# Patient Record
Sex: MALE | Race: WHITE | NOT HISPANIC OR LATINO | Employment: FULL TIME | URBAN - METROPOLITAN AREA
[De-identification: names, ages, dates, MRNs, and addresses within clinical notes are randomized per-mention and may not be internally consistent; named-entity substitution may affect disease eponyms.]

---

## 2023-08-11 LAB — URINE CULTURE: NORMAL

## 2023-11-17 DIAGNOSIS — Z00.00 ANNUAL PHYSICAL EXAM: Primary | ICD-10-CM

## 2023-11-21 ENCOUNTER — PREP FOR PROCEDURE (OUTPATIENT)
Dept: ORTHOPEDIC SURGERY | Facility: CLINIC | Age: 58
End: 2023-11-21
Payer: COMMERCIAL

## 2023-11-21 DIAGNOSIS — M16.11 ARTHRITIS OF RIGHT HIP: Primary | ICD-10-CM

## 2023-11-21 RX ORDER — SODIUM CHLORIDE, SODIUM LACTATE, POTASSIUM CHLORIDE, CALCIUM CHLORIDE 600; 310; 30; 20 MG/100ML; MG/100ML; MG/100ML; MG/100ML
100 INJECTION, SOLUTION INTRAVENOUS CONTINUOUS
Status: CANCELLED | OUTPATIENT
Start: 2023-11-21

## 2023-11-21 RX ORDER — TRANEXAMIC ACID 100 MG/ML
1000 INJECTION, SOLUTION INTRAVENOUS
Status: CANCELLED | OUTPATIENT
Start: 2023-11-21 | End: 2023-11-21

## 2023-11-28 PROBLEM — M16.11 ARTHRITIS OF RIGHT HIP: Status: ACTIVE | Noted: 2023-11-21

## 2024-01-04 ENCOUNTER — OFFICE VISIT (OUTPATIENT)
Dept: ORTHOPEDIC SURGERY | Facility: HOSPITAL | Age: 59
End: 2024-01-04
Payer: COMMERCIAL

## 2024-01-04 VITALS — HEIGHT: 73 IN | BODY MASS INDEX: 27.83 KG/M2 | WEIGHT: 210 LBS

## 2024-01-04 DIAGNOSIS — M16.11 ARTHRITIS OF RIGHT HIP: Primary | ICD-10-CM

## 2024-01-04 PROCEDURE — 1036F TOBACCO NON-USER: CPT | Performed by: ORTHOPAEDIC SURGERY

## 2024-01-04 PROCEDURE — 99204 OFFICE O/P NEW MOD 45 MIN: CPT | Performed by: ORTHOPAEDIC SURGERY

## 2024-01-04 PROCEDURE — 99214 OFFICE O/P EST MOD 30 MIN: CPT | Performed by: ORTHOPAEDIC SURGERY

## 2024-01-04 RX ORDER — ROSUVASTATIN CALCIUM 20 MG/1
20 TABLET, COATED ORAL
COMMUNITY
Start: 2022-08-20 | End: 2024-01-21 | Stop reason: SDUPTHER

## 2024-01-04 RX ORDER — ALLOPURINOL 100 MG/1
1 TABLET ORAL DAILY
COMMUNITY
Start: 2018-10-12 | End: 2024-02-28

## 2024-01-04 ASSESSMENT — PAIN SCALES - GENERAL: PAINLEVEL_OUTOF10: 8

## 2024-01-04 ASSESSMENT — PAIN - FUNCTIONAL ASSESSMENT: PAIN_FUNCTIONAL_ASSESSMENT: 0-10

## 2024-01-04 ASSESSMENT — PAIN DESCRIPTION - DESCRIPTORS: DESCRIPTORS: SHARP;THROBBING

## 2024-01-04 NOTE — PROGRESS NOTES
58-year-old is seen with right hip pain.  He has been having persistent severe sharp shooting pain in the right hip.  He has difficulty with standing and walking and going up and down stairs and getting up and down from a chair in and out of a car.  Has difficulty putting on his shoes and socks.  He does a regular physical therapy type exercise program without relief.  He said pain for many years.  No significant relief with Tylenol or NSAIDs.    Pleasant in no acute distress.  Walks an antalgic gait.  Right hip range of motion is 0 to 85 degrees with limited internal/external rotation.  There is pain and crepitus with range of motion.  Left hip flexes 95 degrees good internal/external rotation without pain.  Both lower extremities are well-perfused the skin is intact and muscle tone is adequate.    Multiple x-ray views of the right hip are personally reviewed and there is advanced degenerative disease complete loss of joint space and osteophyte formation subchondral sclerosis and cystic change.    A detailed discussion about hip arthritis was done.  The patient has been through extensive conservative treatment but there is persistent severe debilitating pain.  Treatment options including no treatment were reviewed and decision was made to proceed with right total hip arthroplasty.  The surgery and postoperative course were reviewed in detail.  Risks including but not limited to infection thromboembolus neurovascular injury fracture bleeding medical problems stiffness implant failure loosening dislocation and leg length discrepancy were discussed and they understands this and has elected to proceed.  They will avoid aggravating activities in the meantime.  He will have medical clearance.  He can have TXA intravenously at the time of the procedure.

## 2024-01-05 ENCOUNTER — ANCILLARY PROCEDURE (OUTPATIENT)
Dept: RADIOLOGY | Facility: CLINIC | Age: 59
End: 2024-01-05
Payer: COMMERCIAL

## 2024-01-05 ENCOUNTER — LAB (OUTPATIENT)
Dept: LAB | Facility: LAB | Age: 59
End: 2024-01-05
Payer: COMMERCIAL

## 2024-01-05 DIAGNOSIS — M16.11 ARTHRITIS OF RIGHT HIP: ICD-10-CM

## 2024-01-05 DIAGNOSIS — Z00.00 ANNUAL PHYSICAL EXAM: ICD-10-CM

## 2024-01-05 LAB
25(OH)D3 SERPL-MCNC: 39 NG/ML (ref 30–100)
ABO GROUP (TYPE) IN BLOOD: NORMAL
ANION GAP SERPL CALC-SCNC: 16 MMOL/L (ref 10–20)
ANTIBODY SCREEN: NORMAL
APPEARANCE UR: CLEAR
APTT PPP: 35 SECONDS (ref 27–38)
BILIRUB UR STRIP.AUTO-MCNC: NEGATIVE MG/DL
BUN SERPL-MCNC: 11 MG/DL (ref 6–23)
CALCIUM SERPL-MCNC: 9.9 MG/DL (ref 8.6–10.6)
CHLORIDE SERPL-SCNC: 101 MMOL/L (ref 98–107)
CO2 SERPL-SCNC: 28 MMOL/L (ref 21–32)
COLOR UR: ABNORMAL
CREAT SERPL-MCNC: 1.14 MG/DL (ref 0.5–1.3)
ERYTHROCYTE [DISTWIDTH] IN BLOOD BY AUTOMATED COUNT: 12.8 % (ref 11.5–14.5)
EST. AVERAGE GLUCOSE BLD GHB EST-MCNC: 108 MG/DL
GFR SERPL CREATININE-BSD FRML MDRD: 74 ML/MIN/1.73M*2
GLUCOSE SERPL-MCNC: 93 MG/DL (ref 74–99)
GLUCOSE UR STRIP.AUTO-MCNC: NEGATIVE MG/DL
HBA1C MFR BLD: 5.4 %
HCT VFR BLD AUTO: 44.2 % (ref 41–52)
HGB BLD-MCNC: 15.5 G/DL (ref 13.5–17.5)
INR PPP: 1.2 (ref 0.9–1.1)
KETONES UR STRIP.AUTO-MCNC: NEGATIVE MG/DL
LEUKOCYTE ESTERASE UR QL STRIP.AUTO: NEGATIVE
MCH RBC QN AUTO: 30.9 PG (ref 26–34)
MCHC RBC AUTO-ENTMCNC: 35.1 G/DL (ref 32–36)
MCV RBC AUTO: 88 FL (ref 80–100)
NITRITE UR QL STRIP.AUTO: NEGATIVE
NRBC BLD-RTO: 0 /100 WBCS (ref 0–0)
PH UR STRIP.AUTO: 6 [PH]
PLATELET # BLD AUTO: 239 X10*3/UL (ref 150–450)
POTASSIUM SERPL-SCNC: 4 MMOL/L (ref 3.5–5.3)
PROT UR STRIP.AUTO-MCNC: NEGATIVE MG/DL
PROTHROMBIN TIME: 13.1 SECONDS (ref 9.8–12.8)
PSA SERPL-MCNC: 0.55 NG/ML
RBC # BLD AUTO: 5.02 X10*6/UL (ref 4.5–5.9)
RBC # UR STRIP.AUTO: NEGATIVE /UL
RH FACTOR (ANTIGEN D): NORMAL
SODIUM SERPL-SCNC: 141 MMOL/L (ref 136–145)
SP GR UR STRIP.AUTO: 1
TSH SERPL-ACNC: 5.02 MIU/L (ref 0.44–3.98)
UROBILINOGEN UR STRIP.AUTO-MCNC: <2 MG/DL
VIT B12 SERPL-MCNC: 404 PG/ML (ref 211–911)
WBC # BLD AUTO: 6.2 X10*3/UL (ref 4.4–11.3)

## 2024-01-05 PROCEDURE — 86901 BLOOD TYPING SEROLOGIC RH(D): CPT

## 2024-01-05 PROCEDURE — 86850 RBC ANTIBODY SCREEN: CPT

## 2024-01-05 PROCEDURE — 85027 COMPLETE CBC AUTOMATED: CPT

## 2024-01-05 PROCEDURE — 81003 URINALYSIS AUTO W/O SCOPE: CPT

## 2024-01-05 PROCEDURE — 85610 PROTHROMBIN TIME: CPT

## 2024-01-05 PROCEDURE — 82306 VITAMIN D 25 HYDROXY: CPT

## 2024-01-05 PROCEDURE — 36415 COLL VENOUS BLD VENIPUNCTURE: CPT

## 2024-01-05 PROCEDURE — 86900 BLOOD TYPING SEROLOGIC ABO: CPT

## 2024-01-05 PROCEDURE — 85730 THROMBOPLASTIN TIME PARTIAL: CPT

## 2024-01-05 PROCEDURE — 73502 X-RAY EXAM HIP UNI 2-3 VIEWS: CPT | Mod: RIGHT SIDE | Performed by: RADIOLOGY

## 2024-01-05 PROCEDURE — 84443 ASSAY THYROID STIM HORMONE: CPT

## 2024-01-05 PROCEDURE — 80048 BASIC METABOLIC PNL TOTAL CA: CPT

## 2024-01-05 PROCEDURE — 73502 X-RAY EXAM HIP UNI 2-3 VIEWS: CPT | Mod: RT

## 2024-01-05 PROCEDURE — 84153 ASSAY OF PSA TOTAL: CPT

## 2024-01-05 PROCEDURE — 82607 VITAMIN B-12: CPT

## 2024-01-05 PROCEDURE — 83036 HEMOGLOBIN GLYCOSYLATED A1C: CPT

## 2024-01-06 LAB — HOLD SPECIMEN: NORMAL

## 2024-01-12 ENCOUNTER — APPOINTMENT (OUTPATIENT)
Dept: PRIMARY CARE | Facility: CLINIC | Age: 59
End: 2024-01-12
Payer: COMMERCIAL

## 2024-01-12 ENCOUNTER — APPOINTMENT (OUTPATIENT)
Dept: CARDIOLOGY | Facility: CLINIC | Age: 59
End: 2024-01-12
Payer: COMMERCIAL

## 2024-01-15 ENCOUNTER — PRE-ADMISSION TESTING (OUTPATIENT)
Dept: PREADMISSION TESTING | Facility: HOSPITAL | Age: 59
End: 2024-01-15
Payer: COMMERCIAL

## 2024-01-15 ENCOUNTER — TELEPHONE (OUTPATIENT)
Dept: PRIMARY CARE | Facility: CLINIC | Age: 59
End: 2024-01-15

## 2024-01-15 VITALS
HEART RATE: 74 BPM | WEIGHT: 212.74 LBS | SYSTOLIC BLOOD PRESSURE: 159 MMHG | TEMPERATURE: 97.2 F | DIASTOLIC BLOOD PRESSURE: 79 MMHG | OXYGEN SATURATION: 99 % | HEIGHT: 73 IN | RESPIRATION RATE: 18 BRPM | BODY MASS INDEX: 28.2 KG/M2

## 2024-01-15 DIAGNOSIS — M16.11 ARTHRITIS OF RIGHT HIP: ICD-10-CM

## 2024-01-15 DIAGNOSIS — Z01.818 PRE-OP TESTING: Primary | ICD-10-CM

## 2024-01-15 LAB
ABO GROUP (TYPE) IN BLOOD: NORMAL
ANTIBODY SCREEN: NORMAL
RH FACTOR (ANTIGEN D): NORMAL

## 2024-01-15 PROCEDURE — 93010 ELECTROCARDIOGRAM REPORT: CPT | Performed by: INTERNAL MEDICINE

## 2024-01-15 PROCEDURE — 93005 ELECTROCARDIOGRAM TRACING: CPT

## 2024-01-15 PROCEDURE — 86900 BLOOD TYPING SEROLOGIC ABO: CPT

## 2024-01-15 PROCEDURE — 94760 N-INVAS EAR/PLS OXIMETRY 1: CPT

## 2024-01-15 PROCEDURE — 36415 COLL VENOUS BLD VENIPUNCTURE: CPT

## 2024-01-15 PROCEDURE — 99204 OFFICE O/P NEW MOD 45 MIN: CPT | Performed by: NURSE PRACTITIONER

## 2024-01-15 PROCEDURE — 87081 CULTURE SCREEN ONLY: CPT | Mod: PARLAB | Performed by: NURSE PRACTITIONER

## 2024-01-15 RX ORDER — BISMUTH SUBSALICYLATE 262 MG
TABLET,CHEWABLE ORAL DAILY
COMMUNITY

## 2024-01-15 ASSESSMENT — DUKE ACTIVITY SCORE INDEX (DASI)
CAN YOU DO MODERATE WORK AROUND THE HOUSE LIKE VACUUMING, SWEEPING FLOORS OR CARRYING GROCERIES: YES
CAN YOU DO HEAVY WORK AROUND THE HOUSE LIKE SCRUBBING FLOORS OR LIFTING AND MOVING HEAVY FURNITURE: YES
CAN YOU WALK INDOORS, SUCH AS AROUND YOUR HOUSE: YES
CAN YOU DO YARD WORK LIKE RAKING LEAVES, WEEDING OR PUSHING A MOWER: YES
CAN YOU DO LIGHT WORK AROUND THE HOUSE LIKE DUSTING OR WASHING DISHES: YES
CAN YOU PARTICIPATE IN MODERATE RECREATIONAL ACTIVITIES LIKE GOLF, BOWLING, DANCING, DOUBLES TENNIS OR THROWING A BASEBALL OR FOOTBALL: NO
CAN YOU PARTICIPATE IN STRENOUS SPORTS LIKE SWIMMING, SINGLES TENNIS, FOOTBALL, BASKETBALL, OR SKIING: NO
CAN YOU HAVE SEXUAL RELATIONS: YES
DASI METS SCORE: 7.3
CAN YOU RUN A SHORT DISTANCE: NO
CAN YOU WALK A BLOCK OR TWO ON LEVEL GROUND: YES
CAN YOU TAKE CARE OF YOURSELF (EAT, DRESS, BATHE, OR USE TOILET): YES
CAN YOU CLIMB A FLIGHT OF STAIRS OR WALK UP A HILL: YES
TOTAL_SCORE: 36.7

## 2024-01-15 ASSESSMENT — PAIN - FUNCTIONAL ASSESSMENT: PAIN_FUNCTIONAL_ASSESSMENT: 0-10

## 2024-01-15 NOTE — PREPROCEDURE INSTRUCTIONS
Medication List            Accurate as of January 15, 2024  7:48 AM. Always use your most recent med list.                allopurinol 100 mg tablet  Commonly known as: Zyloprim  Medication Adjustments for Surgery: Continue until night before surgery     multivitamin tablet  Medication Adjustments for Surgery: Stop 7 days before surgery     rosuvastatin 20 mg tablet  Commonly known as: Crestor  Medication Adjustments for Surgery: Continue until night before surgery              PRE-OPERATIVE INSTRUCTIONS FOR SURGERY    *Do not eat anything after midnight the night of surgery.  This includes food of any kind (including hard candy, cough drops, mints and gum) and beverages (including coffee and soda).      NO MEDICATIONS AM OF SURGERY      *One of our staff members will call you ONE business day before your surgery, between 11a-2p  (EXPECT CALL FRIDATY)  To let you know the time to arrive.  If you have no received a call by 2 pm, call [phone]    *When you arrive at the hospital-->GO TO Registration on the ground floor    *Stop smoking 24 hours prior to surgery.  No Marijuana, CBD Oil or Vaping for 48 hours  *No alcohol 24 hours prior to surgery    *You will need a responsible adult to drive you home    You may be asked to remove your dentures, partial plate, eyeglasses or contact lenses before going to surgery.  Please bring a case for these items.    -Body piercings need to be removed.  Jewelry and valuables should be left at home.    -Put on loose,  comfortable, clean clothing, that will accommodate bandages    HOME PREOPERATIVE ANTIBACTERIAL SHOWER:  -This shower is a way of cleaning the skin with germ killing solution before surgery.  The solution contains Chorhexidine (CHG).   Let your DrMora Know if you are allergic to Chlorhexidine.    NIGHT BEFORE SURGERY:  IF you are having a TOTAL joint replacement- YOU WILL SHOWER 2 NIGHTS PRIOR to surgery    -Take a normal shower and wash your hair  -Turn OFF water to  avoid rinsing the antibacterial skin cleanser off (CHG).  -Apply the CHG cleanser to a clean and wet washcloth.  Lather your entire body from the neck down.    -DO NOT USE ON THE HEAD, FACE, or GENITALS.  -RINSE immediately if CHG is in contact with your eyes, ears or mouth  -Gently wash your body.  Have the CHG cleanser stay on your skin for 3 MINUTES.  -After 3 minutes, turn on water and rinse the CHG cleanser off your body completely  -DO NOT WASH with regular soap after using CHG.  -PAT DRY with a clean fresh towel  -DO NOT apply any milner, deodorants or lotions  -Dress in clean night cloths  **CHG cleanser will cause stains to fabrics if you wash them with bleach after use.     DAY OF SURGERY:  -Take shower-->JUST GET WET.  Turn OFF water.  -REPEAT the CHG cleanse as you did the night before.  -PAT DRY-->    What you may be asked to bring to surgery:  ___Crutches, walker  ______ PHOTO ID AND INSURANCE              NPO Instructions:    Do not eat any food after midnight the night before your surgery/procedure.    Additional Instructions:     Review your medication instructions, take indicated medications  Wear  comfortable loose fitting clothing  Do not use moisturizers, creams, lotions or perfume

## 2024-01-15 NOTE — CPM/PAT H&P
CPM/PAT Evaluation       Name: Tye Hendrickson (Tye Hendrickson)  /Age: 1965/58 y.o.     In-Person       Chief Complaint:     58 yr old male with c/o hip pain.    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   In October, he had trouble walking, trouble putting his foot on the ground--> followed up with Ortho.    He c/o pain to the right groin and lateral aspect of his hip.   He states he had radiation of pain to the calf and right buttock,  in the past.    Patient reports ongoing progressive pain worsened by activity including walking, standing, going up/down steps, and getting up/down from chair.      He has tried various treatments including ice/ heat, voltaren ointment and physical therapy but with no lasting relief.         Pain today is a 6-7 with activity, 0 with sitting.   He managed pain with Alleve in the past.                                                        Denies any cardiac or respiratory symptoms. Denies any past issues with anesthesia  Right total hip replacement is Scheduled on 2024  with Dr. Brasher    Past Medical History:   Diagnosis Date    Arthritis     Encounter for general adult medical examination without abnormal findings 2021    Encounter for wellness examination    Encounter for immunization 2021    Need for shingles vaccine    Hyperlipidemia     Joint pain     Nephrolithiasis     Vision loss        Past Surgical History:   Procedure Laterality Date    COLONOSCOPY      HERNIA REPAIR      OTHER SURGICAL HISTORY  2023    Laparoscopy Repair Of Initial Inguinal Hernia       Patient Sexual activity questions deferred to the physician.    Family History   Problem Relation Name Age of Onset    Hypertension Mother      Colon cancer Father         No Known Allergies    Prior to Admission medications     Medication Sig Start Date End Date Taking? Authorizing Provider   allopurinol (Zyloprim) 100 mg tablet Take 1 tablet (100 mg) by mouth once daily. 10/12/18   Historical Provider, MD   rosuvastatin (Crestor) 20 mg tablet Take 1 tablet (20 mg) by mouth once daily. 8/20/22   Historical Provider, MD        Review of Systems  Constitutional: NO F, chills, or sweats  Eyes: no blurred vision or visual disturbance  ENT: denies congestion, sore throat, difficulty hearing  Cardiovascular: no chest pain, no edema, no palps and no syncope.   Respiratory: no cough,no s.o.b. and no wheezing  Gastrointestinal: no abdominal pain, no N/V, no blood in stools  Genitourinary: no dysuria, no urinary frequency, no urinary hesitancy and no feelings of urinary urgency.   Musculoskeletal: see HPI, no back pain and no myalgias.   Integumentary: no new skin lesions and no rashes.   Neurological:  no headache, no limb weakness, no numbness and no tingling.   Endocrine: no recent weight gain and no recent weight loss.   Hematologic/Lymphatic: no tendency for easy bruising and no swollen glands.      Physical Exam  Constitutional:       Appearance: Normal appearance.   Cardiovascular:      Rate and Rhythm: Normal rate.      Heart sounds: Normal heart sounds.   Pulmonary:      Effort: Pulmonary effort is normal.      Breath sounds: Normal breath sounds.   Abdominal:      General: Bowel sounds are normal.      Palpations: Abdomen is soft.   Musculoskeletal:         General: Normal range of motion.      Cervical back: Normal range of motion.      Right lower leg: No edema.      Left lower leg: No edema.   Skin:     General: Skin is warm and dry.   Neurological:      Mental Status: He is alert and oriented to person, place, and time.        PAT AIRWAY:   Airway:     Mallampati::  II    TM distance::  <3 FB    Neck ROM::  Full  normal        Visit Vitals  /79   Pulse 74   Temp 36.2 °C (97.2 °F) (Temporal)   Resp 18       DASI Risk Score       Flowsheet Row Most Recent Value   DASI SCORE 36.7   METS Score (Will be calculated only when all the questions are answered) 7.3          Caprini DVT Assessment    No data to display       Modified Frailty Index    No data to display       CHADS2 Stroke Risk         N/A 3 - 100%: High Risk   2 - 3%: Medium Risk   0 - 2%: Low Risk     Last Change: N/A          This score determines the patient's risk of having a stroke if the patient has atrial fibrillation.        This score is not applicable to this patient. Components are not calculated.          Revised Cardiac Risk Index    No data to display       Apfel Simplified Score    No data to display       Risk Analysis Index Results This Encounter    No data found in the last 1 encounters.       Stop Bang Score      Flowsheet Row Most Recent Value   Do you snore loudly? 0   Do you often feel tired or fatigued after your sleep? 0   Has anyone ever observed you stop breathing in your sleep? 0   Do you have or are you being treated for high blood pressure? 1   Recent BMI (Calculated) 27.7   Is BMI greater than 35 kg/m2? 0=No   Age older than 50 years old? 1=Yes   Gender - Male 1=Yes            Assessment and Plan:     Endocrine:   TSH: 5.02 on 1/5/2024    Medical Clearance: Dr. Pelayo/ [place] pending

## 2024-01-16 DIAGNOSIS — M16.11 PRIMARY OSTEOARTHRITIS OF RIGHT HIP: Primary | ICD-10-CM

## 2024-01-17 LAB — STAPHYLOCOCCUS SPEC CULT: NORMAL

## 2024-01-21 DIAGNOSIS — Z01.818 PRE-OP EXAM: ICD-10-CM

## 2024-01-21 DIAGNOSIS — E78.2 MIXED HYPERLIPIDEMIA: Primary | ICD-10-CM

## 2024-01-21 RX ORDER — TRIAMTERENE AND HYDROCHLOROTHIAZIDE 37.5; 25 MG/1; MG/1
1 CAPSULE ORAL EVERY MORNING
Qty: 90 CAPSULE | Refills: 0 | Status: SHIPPED | OUTPATIENT
Start: 2024-01-21 | End: 2024-04-15 | Stop reason: SDUPTHER

## 2024-01-21 RX ORDER — ROSUVASTATIN CALCIUM 20 MG/1
20 TABLET, COATED ORAL
Qty: 90 TABLET | Refills: 3 | Status: SHIPPED | OUTPATIENT
Start: 2024-01-21

## 2024-01-21 NOTE — PROGRESS NOTES
Subjective   Patient ID: Tye Hendrickson is a 58 y.o. male who presents for No chief complaint on file..  In for follow up for preop for right total hip surgery. He has has been having right hip pain and limping for several years,   He has enjoyed excellent health and not had any cardiac events, He has also been  screened for colon and prostate cancer,   We reviewed his labs today.        Review of Systems   All other systems reviewed and are negative.      Objective   Physical Exam  Constitutional:       Appearance: Normal appearance.   HENT:      Head: Normocephalic and atraumatic.      Nose: Nose normal.   Cardiovascular:      Rate and Rhythm: Normal rate and regular rhythm.   Abdominal:      General: Abdomen is flat.      Palpations: Abdomen is soft.   Musculoskeletal:         General: Normal range of motion.      Cervical back: Normal range of motion.   Skin:     General: Skin is warm and dry.   Neurological:      Mental Status: He is alert.       142/90  Rr16  P 72        Assessment/Plan   Problem List Items Addressed This Visit       Pre-op exam    Mixed hyperlipidemia - Primary    Relevant Medications    rosuvastatin (Crestor) 20 mg tablet     He is cleared for surgery,

## 2024-01-22 ENCOUNTER — APPOINTMENT (OUTPATIENT)
Dept: RADIOLOGY | Facility: HOSPITAL | Age: 59
End: 2024-01-22
Payer: COMMERCIAL

## 2024-01-22 ENCOUNTER — ANESTHESIA (OUTPATIENT)
Dept: OPERATING ROOM | Facility: HOSPITAL | Age: 59
End: 2024-01-22
Payer: COMMERCIAL

## 2024-01-22 ENCOUNTER — ANESTHESIA EVENT (OUTPATIENT)
Dept: OPERATING ROOM | Facility: HOSPITAL | Age: 59
End: 2024-01-22
Payer: COMMERCIAL

## 2024-01-22 ENCOUNTER — HOSPITAL ENCOUNTER (OUTPATIENT)
Facility: HOSPITAL | Age: 59
Discharge: HOME HEALTH | End: 2024-01-23
Attending: ORTHOPAEDIC SURGERY | Admitting: ORTHOPAEDIC SURGERY
Payer: COMMERCIAL

## 2024-01-22 DIAGNOSIS — Z01.818 PRE-OP TESTING: Primary | ICD-10-CM

## 2024-01-22 DIAGNOSIS — M16.11 ARTHRITIS OF RIGHT HIP: ICD-10-CM

## 2024-01-22 PROBLEM — I10 HTN (HYPERTENSION): Status: ACTIVE | Noted: 2024-01-22

## 2024-01-22 LAB
ABO GROUP (TYPE) IN BLOOD: NORMAL
RH FACTOR (ANTIGEN D): NORMAL

## 2024-01-22 PROCEDURE — 36415 COLL VENOUS BLD VENIPUNCTURE: CPT | Performed by: ORTHOPAEDIC SURGERY

## 2024-01-22 PROCEDURE — 7100000001 HC RECOVERY ROOM TIME - INITIAL BASE CHARGE: Performed by: ORTHOPAEDIC SURGERY

## 2024-01-22 PROCEDURE — A4217 STERILE WATER/SALINE, 500 ML: HCPCS | Performed by: ORTHOPAEDIC SURGERY

## 2024-01-22 PROCEDURE — 2500000004 HC RX 250 GENERAL PHARMACY W/ HCPCS (ALT 636 FOR OP/ED)

## 2024-01-22 PROCEDURE — 88305 TISSUE EXAM BY PATHOLOGIST: CPT | Performed by: STUDENT IN AN ORGANIZED HEALTH CARE EDUCATION/TRAINING PROGRAM

## 2024-01-22 PROCEDURE — 3700000001 HC GENERAL ANESTHESIA TIME - INITIAL BASE CHARGE: Performed by: ORTHOPAEDIC SURGERY

## 2024-01-22 PROCEDURE — 2780000003 HC OR 278 NO HCPCS: Performed by: ORTHOPAEDIC SURGERY

## 2024-01-22 PROCEDURE — 2500000005 HC RX 250 GENERAL PHARMACY W/O HCPCS: Performed by: ANESTHESIOLOGIST ASSISTANT

## 2024-01-22 PROCEDURE — 27130 TOTAL HIP ARTHROPLASTY: CPT | Performed by: ORTHOPAEDIC SURGERY

## 2024-01-22 PROCEDURE — C1776 JOINT DEVICE (IMPLANTABLE): HCPCS | Performed by: ORTHOPAEDIC SURGERY

## 2024-01-22 PROCEDURE — 7100000011 HC EXTENDED STAY RECOVERY HOURLY - NURSING UNIT

## 2024-01-22 PROCEDURE — 2500000004 HC RX 250 GENERAL PHARMACY W/ HCPCS (ALT 636 FOR OP/ED): Performed by: ANESTHESIOLOGY

## 2024-01-22 PROCEDURE — 2500000004 HC RX 250 GENERAL PHARMACY W/ HCPCS (ALT 636 FOR OP/ED): Performed by: ANESTHESIOLOGIST ASSISTANT

## 2024-01-22 PROCEDURE — 2500000004 HC RX 250 GENERAL PHARMACY W/ HCPCS (ALT 636 FOR OP/ED): Performed by: ORTHOPAEDIC SURGERY

## 2024-01-22 PROCEDURE — 73501 X-RAY EXAM HIP UNI 1 VIEW: CPT | Mod: RT

## 2024-01-22 PROCEDURE — 97161 PT EVAL LOW COMPLEX 20 MIN: CPT | Mod: GP

## 2024-01-22 PROCEDURE — 3700000002 HC GENERAL ANESTHESIA TIME - EACH INCREMENTAL 1 MINUTE: Performed by: ORTHOPAEDIC SURGERY

## 2024-01-22 PROCEDURE — C1713 ANCHOR/SCREW BN/BN,TIS/BN: HCPCS | Performed by: ORTHOPAEDIC SURGERY

## 2024-01-22 PROCEDURE — 3600000018 HC OR TIME - INITIAL BASE CHARGE - PROCEDURE LEVEL SIX: Performed by: ORTHOPAEDIC SURGERY

## 2024-01-22 PROCEDURE — 88305 TISSUE EXAM BY PATHOLOGIST: CPT | Mod: TC,SUR,PARLAB,WESLAB | Performed by: ORTHOPAEDIC SURGERY

## 2024-01-22 PROCEDURE — 2720000007 HC OR 272 NO HCPCS: Performed by: ORTHOPAEDIC SURGERY

## 2024-01-22 PROCEDURE — A27130 PR TOTAL HIP ARTHROPLASTY: Performed by: ANESTHESIOLOGIST ASSISTANT

## 2024-01-22 PROCEDURE — 73501 X-RAY EXAM HIP UNI 1 VIEW: CPT | Mod: RIGHT SIDE | Performed by: RADIOLOGY

## 2024-01-22 PROCEDURE — 3600000017 HC OR TIME - EACH INCREMENTAL 1 MINUTE - PROCEDURE LEVEL SIX: Performed by: ORTHOPAEDIC SURGERY

## 2024-01-22 PROCEDURE — 7100000002 HC RECOVERY ROOM TIME - EACH INCREMENTAL 1 MINUTE: Performed by: ORTHOPAEDIC SURGERY

## 2024-01-22 PROCEDURE — 97165 OT EVAL LOW COMPLEX 30 MIN: CPT | Mod: GO

## 2024-01-22 PROCEDURE — 73501 X-RAY EXAM HIP UNI 1 VIEW: CPT | Mod: 59,RT,FR

## 2024-01-22 PROCEDURE — 99222 1ST HOSP IP/OBS MODERATE 55: CPT | Performed by: INTERNAL MEDICINE

## 2024-01-22 PROCEDURE — A27130 PR TOTAL HIP ARTHROPLASTY: Performed by: ANESTHESIOLOGY

## 2024-01-22 PROCEDURE — 2500000001 HC RX 250 WO HCPCS SELF ADMINISTERED DRUGS (ALT 637 FOR MEDICARE OP): Performed by: ORTHOPAEDIC SURGERY

## 2024-01-22 DEVICE — SCREW CANCELLOUS 6.5 X 25: Type: IMPLANTABLE DEVICE | Site: HIP | Status: FUNCTIONAL

## 2024-01-22 DEVICE — FEMORAL HEAD, CERAMIC 36 +1.5: Type: IMPLANTABLE DEVICE | Site: HIP | Status: FUNCTIONAL

## 2024-01-22 DEVICE — IMPLANTABLE DEVICE: Type: IMPLANTABLE DEVICE | Site: HIP | Status: FUNCTIONAL

## 2024-01-22 DEVICE — ACETABULAR CUP, SECTOR, GRIPTON, SIZE 54MM: Type: IMPLANTABLE DEVICE | Site: HIP | Status: FUNCTIONAL

## 2024-01-22 DEVICE — STOP POSITIVE APEX HOLE ELIM: Type: IMPLANTABLE DEVICE | Site: HIP | Status: FUNCTIONAL

## 2024-01-22 RX ORDER — MORPHINE SULFATE 2 MG/ML
2 INJECTION, SOLUTION INTRAMUSCULAR; INTRAVENOUS EVERY 5 MIN PRN
Status: DISCONTINUED | OUTPATIENT
Start: 2024-01-22 | End: 2024-01-22 | Stop reason: HOSPADM

## 2024-01-22 RX ORDER — MIDAZOLAM HYDROCHLORIDE 1 MG/ML
INJECTION, SOLUTION INTRAMUSCULAR; INTRAVENOUS AS NEEDED
Status: DISCONTINUED | OUTPATIENT
Start: 2024-01-22 | End: 2024-01-22

## 2024-01-22 RX ORDER — PROMETHAZINE HYDROCHLORIDE 25 MG/1
25 SUPPOSITORY RECTAL EVERY 12 HOURS PRN
Status: DISCONTINUED | OUTPATIENT
Start: 2024-01-22 | End: 2024-01-23 | Stop reason: HOSPADM

## 2024-01-22 RX ORDER — HYDROMORPHONE HYDROCHLORIDE 1 MG/ML
1 INJECTION, SOLUTION INTRAMUSCULAR; INTRAVENOUS; SUBCUTANEOUS EVERY 5 MIN PRN
Status: DISCONTINUED | OUTPATIENT
Start: 2024-01-22 | End: 2024-01-22 | Stop reason: HOSPADM

## 2024-01-22 RX ORDER — ROCURONIUM BROMIDE 10 MG/ML
INJECTION, SOLUTION INTRAVENOUS AS NEEDED
Status: DISCONTINUED | OUTPATIENT
Start: 2024-01-22 | End: 2024-01-22

## 2024-01-22 RX ORDER — POLYETHYLENE GLYCOL 3350 17 G/17G
17 POWDER, FOR SOLUTION ORAL DAILY
Status: DISCONTINUED | OUTPATIENT
Start: 2024-01-22 | End: 2024-01-23 | Stop reason: HOSPADM

## 2024-01-22 RX ORDER — SODIUM CHLORIDE, SODIUM LACTATE, POTASSIUM CHLORIDE, CALCIUM CHLORIDE 600; 310; 30; 20 MG/100ML; MG/100ML; MG/100ML; MG/100ML
100 INJECTION, SOLUTION INTRAVENOUS CONTINUOUS
Status: DISCONTINUED | OUTPATIENT
Start: 2024-01-22 | End: 2024-01-22 | Stop reason: HOSPADM

## 2024-01-22 RX ORDER — ALBUTEROL SULFATE 0.83 MG/ML
2.5 SOLUTION RESPIRATORY (INHALATION) ONCE AS NEEDED
Status: DISCONTINUED | OUTPATIENT
Start: 2024-01-22 | End: 2024-01-22 | Stop reason: HOSPADM

## 2024-01-22 RX ORDER — NALOXONE HYDROCHLORIDE 1 MG/ML
0.2 INJECTION INTRAMUSCULAR; INTRAVENOUS; SUBCUTANEOUS EVERY 5 MIN PRN
Status: DISCONTINUED | OUTPATIENT
Start: 2024-01-22 | End: 2024-01-23 | Stop reason: HOSPADM

## 2024-01-22 RX ORDER — SODIUM CHLORIDE 0.9 G/100ML
IRRIGANT IRRIGATION AS NEEDED
Status: DISCONTINUED | OUTPATIENT
Start: 2024-01-22 | End: 2024-01-22 | Stop reason: HOSPADM

## 2024-01-22 RX ORDER — DOCUSATE SODIUM 100 MG/1
100 CAPSULE, LIQUID FILLED ORAL 2 TIMES DAILY
Status: DISCONTINUED | OUTPATIENT
Start: 2024-01-22 | End: 2024-01-23 | Stop reason: HOSPADM

## 2024-01-22 RX ORDER — DEXAMETHASONE SODIUM PHOSPHATE 4 MG/ML
INJECTION, SOLUTION INTRA-ARTICULAR; INTRALESIONAL; INTRAMUSCULAR; INTRAVENOUS; SOFT TISSUE AS NEEDED
Status: DISCONTINUED | OUTPATIENT
Start: 2024-01-22 | End: 2024-01-22

## 2024-01-22 RX ORDER — SCOLOPAMINE TRANSDERMAL SYSTEM 1 MG/1
1 PATCH, EXTENDED RELEASE TRANSDERMAL ONCE
Status: DISCONTINUED | OUTPATIENT
Start: 2024-01-22 | End: 2024-01-22 | Stop reason: HOSPADM

## 2024-01-22 RX ORDER — PROPOFOL 10 MG/ML
INJECTION, EMULSION INTRAVENOUS AS NEEDED
Status: DISCONTINUED | OUTPATIENT
Start: 2024-01-22 | End: 2024-01-22

## 2024-01-22 RX ORDER — CEFAZOLIN SODIUM 2 G/100ML
2 INJECTION, SOLUTION INTRAVENOUS EVERY 8 HOURS
Status: COMPLETED | OUTPATIENT
Start: 2024-01-22 | End: 2024-01-23

## 2024-01-22 RX ORDER — ALLOPURINOL 100 MG/1
100 TABLET ORAL DAILY
Status: DISCONTINUED | OUTPATIENT
Start: 2024-01-22 | End: 2024-01-23 | Stop reason: HOSPADM

## 2024-01-22 RX ORDER — LIDOCAINE HCL/PF 100 MG/5ML
SYRINGE (ML) INTRAVENOUS AS NEEDED
Status: DISCONTINUED | OUTPATIENT
Start: 2024-01-22 | End: 2024-01-22

## 2024-01-22 RX ORDER — TRANEXAMIC ACID 10 MG/ML
1000 INJECTION, SOLUTION INTRAVENOUS
Status: COMPLETED | OUTPATIENT
Start: 2024-01-22 | End: 2024-01-22

## 2024-01-22 RX ORDER — PROMETHAZINE HYDROCHLORIDE 25 MG/1
25 TABLET ORAL EVERY 6 HOURS PRN
Status: DISCONTINUED | OUTPATIENT
Start: 2024-01-22 | End: 2024-01-23 | Stop reason: HOSPADM

## 2024-01-22 RX ORDER — ACETAMINOPHEN 325 MG/1
650 TABLET ORAL EVERY 6 HOURS SCHEDULED
Status: DISCONTINUED | OUTPATIENT
Start: 2024-01-22 | End: 2024-01-23 | Stop reason: HOSPADM

## 2024-01-22 RX ORDER — MIDAZOLAM HYDROCHLORIDE 1 MG/ML
1 INJECTION, SOLUTION INTRAMUSCULAR; INTRAVENOUS ONCE AS NEEDED
Status: DISCONTINUED | OUTPATIENT
Start: 2024-01-22 | End: 2024-01-22 | Stop reason: HOSPADM

## 2024-01-22 RX ORDER — MEPERIDINE HYDROCHLORIDE 25 MG/ML
12.5 INJECTION INTRAMUSCULAR; INTRAVENOUS; SUBCUTANEOUS EVERY 10 MIN PRN
Status: DISCONTINUED | OUTPATIENT
Start: 2024-01-22 | End: 2024-01-22 | Stop reason: HOSPADM

## 2024-01-22 RX ORDER — GLYCOPYRROLATE 0.2 MG/ML
INJECTION INTRAMUSCULAR; INTRAVENOUS AS NEEDED
Status: DISCONTINUED | OUTPATIENT
Start: 2024-01-22 | End: 2024-01-22

## 2024-01-22 RX ORDER — ROSUVASTATIN CALCIUM 10 MG/1
20 TABLET, COATED ORAL
Status: DISCONTINUED | OUTPATIENT
Start: 2024-01-22 | End: 2024-01-23 | Stop reason: HOSPADM

## 2024-01-22 RX ORDER — TRANEXAMIC ACID 10 MG/ML
INJECTION, SOLUTION INTRAVENOUS
Status: COMPLETED
Start: 2024-01-22 | End: 2024-01-22

## 2024-01-22 RX ORDER — HYDRALAZINE HYDROCHLORIDE 20 MG/ML
5 INJECTION INTRAMUSCULAR; INTRAVENOUS EVERY 30 MIN PRN
Status: DISCONTINUED | OUTPATIENT
Start: 2024-01-22 | End: 2024-01-22 | Stop reason: HOSPADM

## 2024-01-22 RX ORDER — LABETALOL HYDROCHLORIDE 5 MG/ML
5 INJECTION, SOLUTION INTRAVENOUS ONCE AS NEEDED
Status: DISCONTINUED | OUTPATIENT
Start: 2024-01-22 | End: 2024-01-22 | Stop reason: HOSPADM

## 2024-01-22 RX ORDER — ACETAMINOPHEN 325 MG/1
975 TABLET ORAL ONCE
Status: DISCONTINUED | OUTPATIENT
Start: 2024-01-22 | End: 2024-01-22 | Stop reason: HOSPADM

## 2024-01-22 RX ORDER — DIPHENHYDRAMINE HYDROCHLORIDE 50 MG/ML
25 INJECTION INTRAMUSCULAR; INTRAVENOUS ONCE AS NEEDED
Status: DISCONTINUED | OUTPATIENT
Start: 2024-01-22 | End: 2024-01-22 | Stop reason: HOSPADM

## 2024-01-22 RX ORDER — OXYCODONE HYDROCHLORIDE 5 MG/1
5 TABLET ORAL EVERY 6 HOURS PRN
Status: DISCONTINUED | OUTPATIENT
Start: 2024-01-22 | End: 2024-01-23 | Stop reason: HOSPADM

## 2024-01-22 RX ORDER — ASPIRIN 81 MG/1
81 TABLET ORAL 2 TIMES DAILY
Status: DISCONTINUED | OUTPATIENT
Start: 2024-01-23 | End: 2024-01-23 | Stop reason: HOSPADM

## 2024-01-22 RX ORDER — SODIUM CHLORIDE, SODIUM LACTATE, POTASSIUM CHLORIDE, CALCIUM CHLORIDE 600; 310; 30; 20 MG/100ML; MG/100ML; MG/100ML; MG/100ML
100 INJECTION, SOLUTION INTRAVENOUS CONTINUOUS
Status: DISCONTINUED | OUTPATIENT
Start: 2024-01-22 | End: 2024-01-23 | Stop reason: HOSPADM

## 2024-01-22 RX ORDER — TRIAMTERENE/HYDROCHLOROTHIAZID 37.5-25 MG
1 TABLET ORAL EVERY MORNING
Status: DISCONTINUED | OUTPATIENT
Start: 2024-01-22 | End: 2024-01-22

## 2024-01-22 RX ORDER — FAMOTIDINE 10 MG/ML
20 INJECTION INTRAVENOUS ONCE
Status: DISCONTINUED | OUTPATIENT
Start: 2024-01-22 | End: 2024-01-22 | Stop reason: HOSPADM

## 2024-01-22 RX ORDER — ONDANSETRON HYDROCHLORIDE 2 MG/ML
INJECTION, SOLUTION INTRAVENOUS AS NEEDED
Status: DISCONTINUED | OUTPATIENT
Start: 2024-01-22 | End: 2024-01-22

## 2024-01-22 RX ORDER — DIPHENHYDRAMINE HYDROCHLORIDE 50 MG/ML
12.5 INJECTION INTRAMUSCULAR; INTRAVENOUS EVERY 6 HOURS PRN
Status: DISCONTINUED | OUTPATIENT
Start: 2024-01-22 | End: 2024-01-23 | Stop reason: HOSPADM

## 2024-01-22 RX ORDER — OXYCODONE HYDROCHLORIDE 5 MG/1
10 TABLET ORAL EVERY 4 HOURS PRN
Status: DISCONTINUED | OUTPATIENT
Start: 2024-01-22 | End: 2024-01-23 | Stop reason: HOSPADM

## 2024-01-22 RX ORDER — DOCUSATE SODIUM 100 MG/1
100 CAPSULE, LIQUID FILLED ORAL DAILY
Status: CANCELLED
Start: 2024-01-22

## 2024-01-22 RX ORDER — ONDANSETRON 4 MG/1
4 TABLET, ORALLY DISINTEGRATING ORAL EVERY 8 HOURS PRN
Status: DISCONTINUED | OUTPATIENT
Start: 2024-01-22 | End: 2024-01-23 | Stop reason: HOSPADM

## 2024-01-22 RX ORDER — CEFAZOLIN SODIUM 2 G/100ML
2 INJECTION, SOLUTION INTRAVENOUS ONCE
Status: COMPLETED | OUTPATIENT
Start: 2024-01-22 | End: 2024-01-22

## 2024-01-22 RX ORDER — ONDANSETRON HYDROCHLORIDE 2 MG/ML
4 INJECTION, SOLUTION INTRAVENOUS EVERY 8 HOURS PRN
Status: DISCONTINUED | OUTPATIENT
Start: 2024-01-22 | End: 2024-01-23 | Stop reason: HOSPADM

## 2024-01-22 RX ORDER — SODIUM CHLORIDE, SODIUM LACTATE, POTASSIUM CHLORIDE, CALCIUM CHLORIDE 600; 310; 30; 20 MG/100ML; MG/100ML; MG/100ML; MG/100ML
100 INJECTION, SOLUTION INTRAVENOUS CONTINUOUS
Status: DISCONTINUED | OUTPATIENT
Start: 2024-01-22 | End: 2024-01-22

## 2024-01-22 RX ORDER — FENTANYL CITRATE 50 UG/ML
INJECTION, SOLUTION INTRAMUSCULAR; INTRAVENOUS AS NEEDED
Status: DISCONTINUED | OUTPATIENT
Start: 2024-01-22 | End: 2024-01-22

## 2024-01-22 RX ORDER — DIPHENHYDRAMINE HCL 12.5MG/5ML
12.5 LIQUID (ML) ORAL EVERY 6 HOURS PRN
Status: DISCONTINUED | OUTPATIENT
Start: 2024-01-22 | End: 2024-01-23 | Stop reason: HOSPADM

## 2024-01-22 RX ORDER — OXYCODONE HYDROCHLORIDE 5 MG/1
2.5 TABLET ORAL EVERY 4 HOURS PRN
Status: DISCONTINUED | OUTPATIENT
Start: 2024-01-22 | End: 2024-01-23 | Stop reason: HOSPADM

## 2024-01-22 RX ORDER — METOPROLOL TARTRATE 1 MG/ML
5 INJECTION, SOLUTION INTRAVENOUS ONCE
Status: DISCONTINUED | OUTPATIENT
Start: 2024-01-22 | End: 2024-01-22 | Stop reason: HOSPADM

## 2024-01-22 RX ORDER — ONDANSETRON HYDROCHLORIDE 2 MG/ML
4 INJECTION, SOLUTION INTRAVENOUS ONCE AS NEEDED
Status: DISCONTINUED | OUTPATIENT
Start: 2024-01-22 | End: 2024-01-22 | Stop reason: HOSPADM

## 2024-01-22 RX ADMIN — ROCURONIUM BROMIDE 10 MG: 10 INJECTION, SOLUTION INTRAVENOUS at 11:00

## 2024-01-22 RX ADMIN — LIDOCAINE HYDROCHLORIDE 100 MG: 20 INJECTION INTRAVENOUS at 09:49

## 2024-01-22 RX ADMIN — TRANEXAMIC ACID 1000 MG: 10 INJECTION, SOLUTION INTRAVENOUS at 09:56

## 2024-01-22 RX ADMIN — OXYCODONE HYDROCHLORIDE 10 MG: 5 TABLET ORAL at 18:27

## 2024-01-22 RX ADMIN — HYDROMORPHONE HYDROCHLORIDE 0.5 MG: 2 INJECTION, SOLUTION INTRAMUSCULAR; INTRAVENOUS; SUBCUTANEOUS at 12:11

## 2024-01-22 RX ADMIN — DOCUSATE SODIUM 100 MG: 100 CAPSULE, LIQUID FILLED ORAL at 20:42

## 2024-01-22 RX ADMIN — FENTANYL CITRATE 100 MCG: 50 INJECTION, SOLUTION INTRAMUSCULAR; INTRAVENOUS at 09:49

## 2024-01-22 RX ADMIN — HYDROMORPHONE HYDROCHLORIDE 0.5 MG: 2 INJECTION, SOLUTION INTRAMUSCULAR; INTRAVENOUS; SUBCUTANEOUS at 10:09

## 2024-01-22 RX ADMIN — HYDROMORPHONE HYDROCHLORIDE 0.5 MG: 1 INJECTION, SOLUTION INTRAMUSCULAR; INTRAVENOUS; SUBCUTANEOUS at 20:42

## 2024-01-22 RX ADMIN — HYDROMORPHONE HYDROCHLORIDE 0.5 MG: 2 INJECTION, SOLUTION INTRAMUSCULAR; INTRAVENOUS; SUBCUTANEOUS at 11:48

## 2024-01-22 RX ADMIN — ONDANSETRON 4 MG: 2 INJECTION INTRAMUSCULAR; INTRAVENOUS at 11:47

## 2024-01-22 RX ADMIN — SODIUM CHLORIDE, SODIUM LACTATE, POTASSIUM CHLORIDE, AND CALCIUM CHLORIDE: 600; 310; 30; 20 INJECTION, SOLUTION INTRAVENOUS at 10:22

## 2024-01-22 RX ADMIN — DEXAMETHASONE SODIUM PHOSPHATE 4 MG: 4 INJECTION, SOLUTION INTRAMUSCULAR; INTRAVENOUS at 10:12

## 2024-01-22 RX ADMIN — PROPOFOL 200 MG: 10 INJECTION, EMULSION INTRAVENOUS at 09:49

## 2024-01-22 RX ADMIN — OXYCODONE HYDROCHLORIDE 5 MG: 5 TABLET ORAL at 23:23

## 2024-01-22 RX ADMIN — SUGAMMADEX 200 MG: 100 INJECTION, SOLUTION INTRAVENOUS at 11:50

## 2024-01-22 RX ADMIN — SODIUM CHLORIDE, POTASSIUM CHLORIDE, SODIUM LACTATE AND CALCIUM CHLORIDE 100 ML/HR: 600; 310; 30; 20 INJECTION, SOLUTION INTRAVENOUS at 13:00

## 2024-01-22 RX ADMIN — ALLOPURINOL 100 MG: 100 TABLET ORAL at 20:42

## 2024-01-22 RX ADMIN — ROCURONIUM BROMIDE 20 MG: 10 INJECTION, SOLUTION INTRAVENOUS at 10:14

## 2024-01-22 RX ADMIN — ACETAMINOPHEN 650 MG: 325 TABLET ORAL at 18:15

## 2024-01-22 RX ADMIN — ACETAMINOPHEN 650 MG: 325 TABLET ORAL at 13:41

## 2024-01-22 RX ADMIN — TRANEXAMIC ACID 1000 MG: 10 INJECTION, SOLUTION INTRAVENOUS at 11:32

## 2024-01-22 RX ADMIN — SODIUM CHLORIDE, POTASSIUM CHLORIDE, SODIUM LACTATE AND CALCIUM CHLORIDE 100 ML/HR: 600; 310; 30; 20 INJECTION, SOLUTION INTRAVENOUS at 15:03

## 2024-01-22 RX ADMIN — CEFAZOLIN SODIUM 2 G: 2 INJECTION, SOLUTION INTRAVENOUS at 09:55

## 2024-01-22 RX ADMIN — GLYCOPYRROLATE 0.2 MG: 0.2 INJECTION, SOLUTION INTRAMUSCULAR; INTRAVENOUS at 10:36

## 2024-01-22 RX ADMIN — HYDROMORPHONE HYDROCHLORIDE 0.5 MG: 2 INJECTION, SOLUTION INTRAMUSCULAR; INTRAVENOUS; SUBCUTANEOUS at 12:20

## 2024-01-22 RX ADMIN — MIDAZOLAM 2 MG: 1 INJECTION INTRAMUSCULAR; INTRAVENOUS at 09:42

## 2024-01-22 RX ADMIN — SODIUM CHLORIDE, SODIUM LACTATE, POTASSIUM CHLORIDE, AND CALCIUM CHLORIDE 100 ML/HR: 600; 310; 30; 20 INJECTION, SOLUTION INTRAVENOUS at 08:02

## 2024-01-22 RX ADMIN — ROCURONIUM BROMIDE 50 MG: 10 INJECTION, SOLUTION INTRAVENOUS at 09:49

## 2024-01-22 RX ADMIN — ROCURONIUM BROMIDE 10 MG: 10 INJECTION, SOLUTION INTRAVENOUS at 10:41

## 2024-01-22 RX ADMIN — ROSUVASTATIN CALCIUM 20 MG: 10 TABLET, FILM COATED ORAL at 20:42

## 2024-01-22 RX ADMIN — CEFAZOLIN SODIUM 2 G: 2 INJECTION, SOLUTION INTRAVENOUS at 18:15

## 2024-01-22 RX ADMIN — ACETAMINOPHEN 650 MG: 325 TABLET ORAL at 23:23

## 2024-01-22 RX ADMIN — POLYETHYLENE GLYCOL 3350 17 G: 17 POWDER, FOR SOLUTION ORAL at 20:42

## 2024-01-22 SDOH — SOCIAL STABILITY: SOCIAL INSECURITY
WITHIN THE LAST YEAR, HAVE TO BEEN RAPED OR FORCED TO HAVE ANY KIND OF SEXUAL ACTIVITY BY YOUR PARTNER OR EX-PARTNER?: NO

## 2024-01-22 SDOH — SOCIAL STABILITY: SOCIAL INSECURITY
WITHIN THE LAST YEAR, HAVE YOU BEEN KICKED, HIT, SLAPPED, OR OTHERWISE PHYSICALLY HURT BY YOUR PARTNER OR EX-PARTNER?: NO

## 2024-01-22 SDOH — SOCIAL STABILITY: SOCIAL INSECURITY: DO YOU FEEL ANYONE HAS EXPLOITED OR TAKEN ADVANTAGE OF YOU FINANCIALLY OR OF YOUR PERSONAL PROPERTY?: NO

## 2024-01-22 SDOH — HEALTH STABILITY: MENTAL HEALTH: HOW OFTEN DO YOU HAVE A DRINK CONTAINING ALCOHOL?: NEVER

## 2024-01-22 SDOH — ECONOMIC STABILITY: HOUSING INSECURITY
IN THE LAST 12 MONTHS, WAS THERE A TIME WHEN YOU DID NOT HAVE A STEADY PLACE TO SLEEP OR SLEPT IN A SHELTER (INCLUDING NOW)?: NO

## 2024-01-22 SDOH — HEALTH STABILITY: MENTAL HEALTH: HOW OFTEN DO YOU HAVE 6 OR MORE DRINKS ON ONE OCCASION?: NEVER

## 2024-01-22 SDOH — ECONOMIC STABILITY: INCOME INSECURITY: IN THE PAST 12 MONTHS, HAS THE ELECTRIC, GAS, OIL, OR WATER COMPANY THREATENED TO SHUT OFF SERVICE IN YOUR HOME?: NO

## 2024-01-22 SDOH — ECONOMIC STABILITY: TRANSPORTATION INSECURITY
IN THE PAST 12 MONTHS, HAS LACK OF TRANSPORTATION KEPT YOU FROM MEETINGS, WORK, OR FROM GETTING THINGS NEEDED FOR DAILY LIVING?: NO

## 2024-01-22 SDOH — HEALTH STABILITY: MENTAL HEALTH
STRESS IS WHEN SOMEONE FEELS TENSE, NERVOUS, ANXIOUS, OR CAN'T SLEEP AT NIGHT BECAUSE THEIR MIND IS TROUBLED. HOW STRESSED ARE YOU?: NOT AT ALL

## 2024-01-22 SDOH — SOCIAL STABILITY: SOCIAL INSECURITY: WITHIN THE LAST YEAR, HAVE YOU BEEN HUMILIATED OR EMOTIONALLY ABUSED IN OTHER WAYS BY YOUR PARTNER OR EX-PARTNER?: NO

## 2024-01-22 SDOH — SOCIAL STABILITY: SOCIAL INSECURITY: WITHIN THE LAST YEAR, HAVE YOU BEEN AFRAID OF YOUR PARTNER OR EX-PARTNER?: NO

## 2024-01-22 SDOH — SOCIAL STABILITY: SOCIAL INSECURITY: ARE YOU OR HAVE YOU BEEN THREATENED OR ABUSED PHYSICALLY, EMOTIONALLY, OR SEXUALLY BY ANYONE?: NO

## 2024-01-22 SDOH — SOCIAL STABILITY: SOCIAL INSECURITY: DO YOU FEEL UNSAFE GOING BACK TO THE PLACE WHERE YOU ARE LIVING?: NO

## 2024-01-22 SDOH — SOCIAL STABILITY: SOCIAL INSECURITY: WERE YOU ABLE TO COMPLETE ALL THE BEHAVIORAL HEALTH SCREENINGS?: YES

## 2024-01-22 SDOH — SOCIAL STABILITY: SOCIAL INSECURITY: ABUSE: ADULT

## 2024-01-22 SDOH — ECONOMIC STABILITY: INCOME INSECURITY: IN THE LAST 12 MONTHS, WAS THERE A TIME WHEN YOU WERE NOT ABLE TO PAY THE MORTGAGE OR RENT ON TIME?: NO

## 2024-01-22 SDOH — ECONOMIC STABILITY: INCOME INSECURITY: HOW HARD IS IT FOR YOU TO PAY FOR THE VERY BASICS LIKE FOOD, HOUSING, MEDICAL CARE, AND HEATING?: NOT HARD AT ALL

## 2024-01-22 SDOH — ECONOMIC STABILITY: FOOD INSECURITY: WITHIN THE PAST 12 MONTHS, YOU WORRIED THAT YOUR FOOD WOULD RUN OUT BEFORE YOU GOT MONEY TO BUY MORE.: NEVER TRUE

## 2024-01-22 SDOH — ECONOMIC STABILITY: FOOD INSECURITY: WITHIN THE PAST 12 MONTHS, THE FOOD YOU BOUGHT JUST DIDN'T LAST AND YOU DIDN'T HAVE MONEY TO GET MORE.: NEVER TRUE

## 2024-01-22 SDOH — HEALTH STABILITY: MENTAL HEALTH: CURRENT SMOKER: 0

## 2024-01-22 SDOH — SOCIAL STABILITY: SOCIAL INSECURITY: ARE THERE ANY APPARENT SIGNS OF INJURIES/BEHAVIORS THAT COULD BE RELATED TO ABUSE/NEGLECT?: NO

## 2024-01-22 SDOH — ECONOMIC STABILITY: TRANSPORTATION INSECURITY
IN THE PAST 12 MONTHS, HAS THE LACK OF TRANSPORTATION KEPT YOU FROM MEDICAL APPOINTMENTS OR FROM GETTING MEDICATIONS?: NO

## 2024-01-22 SDOH — SOCIAL STABILITY: SOCIAL INSECURITY: HAVE YOU HAD THOUGHTS OF HARMING ANYONE ELSE?: NO

## 2024-01-22 SDOH — SOCIAL STABILITY: SOCIAL NETWORK: IN A TYPICAL WEEK, HOW MANY TIMES DO YOU TALK ON THE PHONE WITH FAMILY, FRIENDS, OR NEIGHBORS?: NEVER

## 2024-01-22 SDOH — SOCIAL STABILITY: SOCIAL INSECURITY: HAS ANYONE EVER THREATENED TO HURT YOUR FAMILY OR YOUR PETS?: NO

## 2024-01-22 SDOH — SOCIAL STABILITY: SOCIAL INSECURITY: DOES ANYONE TRY TO KEEP YOU FROM HAVING/CONTACTING OTHER FRIENDS OR DOING THINGS OUTSIDE YOUR HOME?: NO

## 2024-01-22 SDOH — HEALTH STABILITY: MENTAL HEALTH: HOW MANY STANDARD DRINKS CONTAINING ALCOHOL DO YOU HAVE ON A TYPICAL DAY?: PATIENT DOES NOT DRINK

## 2024-01-22 SDOH — ECONOMIC STABILITY: HOUSING INSECURITY: IN THE LAST 12 MONTHS, HOW MANY PLACES HAVE YOU LIVED?: 1

## 2024-01-22 ASSESSMENT — ACTIVITIES OF DAILY LIVING (ADL)
BATHING: NEEDS ASSISTANCE
FEEDING YOURSELF: NEEDS ASSISTANCE
GROOMING: NEEDS ASSISTANCE
HEARING - RIGHT EAR: FUNCTIONAL
JUDGMENT_ADEQUATE_SAFELY_COMPLETE_DAILY_ACTIVITIES: YES
PATIENT'S MEMORY ADEQUATE TO SAFELY COMPLETE DAILY ACTIVITIES?: YES
WALKS IN HOME: NEEDS ASSISTANCE
ADEQUATE_TO_COMPLETE_ADL: YES
LACK_OF_TRANSPORTATION: NO
HEARING - LEFT EAR: FUNCTIONAL
DRESSING YOURSELF: NEEDS ASSISTANCE
TOILETING: NEEDS ASSISTANCE

## 2024-01-22 ASSESSMENT — PAIN - FUNCTIONAL ASSESSMENT
PAIN_FUNCTIONAL_ASSESSMENT: 0-10

## 2024-01-22 ASSESSMENT — PATIENT HEALTH QUESTIONNAIRE - PHQ9
1. LITTLE INTEREST OR PLEASURE IN DOING THINGS: NOT AT ALL
SUM OF ALL RESPONSES TO PHQ9 QUESTIONS 1 & 2: 0
2. FEELING DOWN, DEPRESSED OR HOPELESS: NOT AT ALL

## 2024-01-22 ASSESSMENT — LIFESTYLE VARIABLES
SKIP TO QUESTIONS 9-10: 1
PRESCIPTION_ABUSE_PAST_12_MONTHS: NO
HOW MANY STANDARD DRINKS CONTAINING ALCOHOL DO YOU HAVE ON A TYPICAL DAY: PATIENT DOES NOT DRINK
SKIP TO QUESTIONS 9-10: 1
HOW OFTEN DO YOU HAVE 6 OR MORE DRINKS ON ONE OCCASION: NEVER
AUDIT-C TOTAL SCORE: 0
SUBSTANCE_ABUSE_PAST_12_MONTHS: NO
AUDIT-C TOTAL SCORE: 0
HOW OFTEN DO YOU HAVE A DRINK CONTAINING ALCOHOL: NEVER
AUDIT-C TOTAL SCORE: 0

## 2024-01-22 ASSESSMENT — COGNITIVE AND FUNCTIONAL STATUS - GENERAL
TURNING FROM BACK TO SIDE WHILE IN FLAT BAD: A LITTLE
HELP NEEDED FOR BATHING: A LOT
STANDING UP FROM CHAIR USING ARMS: A LITTLE
DAILY ACTIVITIY SCORE: 19
PERSONAL GROOMING: A LITTLE
DAILY ACTIVITIY SCORE: 19
MOBILITY SCORE: 18
HELP NEEDED FOR BATHING: A LITTLE
MOVING TO AND FROM BED TO CHAIR: A LITTLE
TOILETING: A LITTLE
WALKING IN HOSPITAL ROOM: A LITTLE
WALKING IN HOSPITAL ROOM: A LITTLE
MOVING TO AND FROM BED TO CHAIR: A LITTLE
PATIENT BASELINE BEDBOUND: NO
TOILETING: A LITTLE
HELP NEEDED FOR BATHING: A LOT
MOVING TO AND FROM BED TO CHAIR: A LITTLE
STANDING UP FROM CHAIR USING ARMS: A LITTLE
CLIMB 3 TO 5 STEPS WITH RAILING: TOTAL
DRESSING REGULAR LOWER BODY CLOTHING: A LOT
DRESSING REGULAR LOWER BODY CLOTHING: A LITTLE
TOILETING: A LOT
CLIMB 3 TO 5 STEPS WITH RAILING: TOTAL
STANDING UP FROM CHAIR USING ARMS: A LITTLE
CLIMB 3 TO 5 STEPS WITH RAILING: A LITTLE
MOBILITY SCORE: 18
WALKING IN HOSPITAL ROOM: A LITTLE
DAILY ACTIVITIY SCORE: 18
DRESSING REGULAR UPPER BODY CLOTHING: A LITTLE
MOVING FROM LYING ON BACK TO SITTING ON SIDE OF FLAT BED WITH BEDRAILS: A LITTLE
DRESSING REGULAR LOWER BODY CLOTHING: A LOT
MOBILITY SCORE: 18

## 2024-01-22 ASSESSMENT — PAIN SCALES - GENERAL
PAINLEVEL_OUTOF10: 7
PAINLEVEL_OUTOF10: 0 - NO PAIN
PAINLEVEL_OUTOF10: 7
PAINLEVEL_OUTOF10: 4
PAINLEVEL_OUTOF10: 0 - NO PAIN
PAINLEVEL_OUTOF10: 4
PAIN_LEVEL: 3
PAINLEVEL_OUTOF10: 4
PAINLEVEL_OUTOF10: 3
PAINLEVEL_OUTOF10: 4
PAINLEVEL_OUTOF10: 2
PAINLEVEL_OUTOF10: 5 - MODERATE PAIN

## 2024-01-22 ASSESSMENT — COLUMBIA-SUICIDE SEVERITY RATING SCALE - C-SSRS
6. HAVE YOU EVER DONE ANYTHING, STARTED TO DO ANYTHING, OR PREPARED TO DO ANYTHING TO END YOUR LIFE?: NO
2. HAVE YOU ACTUALLY HAD ANY THOUGHTS OF KILLING YOURSELF?: NO
1. IN THE PAST MONTH, HAVE YOU WISHED YOU WERE DEAD OR WISHED YOU COULD GO TO SLEEP AND NOT WAKE UP?: NO

## 2024-01-22 NOTE — CONSULTS
History Of Present Illness  Tye Hendrickson is a 58 y.o. male presenting with H/o right hip pain causing ambulatory dysfunction for year.  H/o dyslipidemia and gout.   Underwent hip replacement today.  Post op pain controlled at this time.  Vitals stable.  Medicine consulted for medical management post op.  .     Past Medical History  He has a past medical history of Arthritis, Encounter for general adult medical examination without abnormal findings (09/02/2021), Encounter for immunization (12/03/2021), Hyperlipidemia, Joint pain, Nephrolithiasis, and Vision loss.    Surgical History  He has a past surgical history that includes Other surgical history (01/06/2023); Hernia repair; and Colonoscopy.       Social History  He reports that he has never smoked. He has never used smokeless tobacco. He reports that he does not currently use alcohol. He reports that he does not use drugs.      Family History  Family History   Problem Relation Name Age of Onset    Hypertension Mother      Colon cancer Father            Allergies  Patient has no known allergies.      Review of Systems   Constitutional:  Negative for chills, fatigue and fever.   HENT:  Negative for congestion, ear pain, facial swelling, hearing loss and trouble swallowing.    Eyes:  Negative for photophobia, pain, redness and visual disturbance.   Respiratory:  Negative for cough, chest tightness, shortness of breath and wheezing.    Cardiovascular:  Negative for chest pain, palpitations and leg swelling.   Gastrointestinal:  Negative for abdominal distention, abdominal pain and nausea.   Endocrine: Negative for cold intolerance, heat intolerance, polydipsia and polyuria.   Genitourinary:  Negative for difficulty urinating, frequency and hematuria.   Skin:  Negative for color change, rash and wound.   Neurological:  Negative for dizziness, light-headedness, numbness and headaches.   Psychiatric/Behavioral:  Negative for agitation, confusion and suicidal ideas.         Physical Exam  GENERAL:   no distress, alert and cooperative  HEENT: Normal Inspection, Mucous membranes moist, No JVD, No Lymphadenopathy  CARDIOVASCULAR: RRR, no murmurs, 2+ equal pulses of the extremities, normal S1 and S 2  RESPIRATORY: Patent airways, CTAB, thorax symmetric, No significant wheezing, Rales or Rhonchi  ABDOMEN: Soft, Non-Tender, Normal Bowel Sounds, No Distention  SKIN: Warm and dry, no lesions, no rashes  EXTREMITIES: normal extremities, no significant cyanosis edema, contusions or wounds, no obsvious clubbing  NEURO: A&O x 3, CN II-XII grossly intact  PSYCH: Appropriate mood and behavior    Additional Physical Exam Notes/Findings      Last Recorded Vitals  /90   Pulse 71   Temp 36.5 °C (97.7 °F)   Resp 16   Wt 95.3 kg (210 lb)   SpO2 98%   BMI 27.71 kg/m²     Intake/Output last 3 Shifts:  No intake/output data recorded.      =========RELEVANT RESULTS ==========  Labs  Lab Results   Component Value Date    WBC 6.2 01/05/2024    HGB 15.5 01/05/2024    HCT 44.2 01/05/2024    MCV 88 01/05/2024     01/05/2024     Lab Results   Component Value Date    GLUCOSE 93 01/05/2024    CALCIUM 9.9 01/05/2024     01/05/2024    K 4.0 01/05/2024    CO2 28 01/05/2024     01/05/2024    BUN 11 01/05/2024    CREATININE 1.14 01/05/2024      Lab Results   Component Value Date    ALT 19 11/18/2022        Recent Echocardiogram (14D):   No echocardiogram results found for the past 14 days    TTE 12 month if available:  No echocardiogram results found for the past 12 months    Recent Imaging Results:  XR hip right with pelvis when performed 1 view  Narrative: STUDY:  Hip Radiographs; 01/22/2024 at 12:50 PM  INDICATION:  Post right hip replacement.  COMPARISON:  01/22/24 XR Hip (right) at 11:39, 01/05/24 XR Hip with Pelvis (right).  ACCESSION NUMBER(S):  [id number]  ORDERING CLINICIAN:  CHERELLE MARROQUIN  TECHNIQUE:  One view(s) of the right hip.  FINDINGS:    The right hip arthroplasty is  in anatomic alignment.  There is no  displaced fracture.  There is no evidence of orthopedic hardware  complications.  Expected air is seen in the soft tissues.  Impression: Normal postoperative right hip arthroplasty.  Signed by Matteo Martines MD          ======= SCHEDULED MEDICATIONS =======  Scheduled medications   Medication Dose Route Frequency    acetaminophen  650 mg oral q6h SAIGE    allopurinol  100 mg oral Daily    [START ON 1/23/2024] aspirin  81 mg oral BID    ceFAZolin  2 g intravenous q8h    docusate sodium  100 mg oral BID    polyethylene glycol  17 g oral Daily    rosuvastatin  20 mg oral Daily       ========== PRN MEDICATIONS =========  diphenhydrAMINE, 12.5 mg, q6h PRN  diphenhydrAMINE, 12.5 mg, q6h PRN  HYDROmorphone, 0.5 mg, q4h PRN  HYDROmorphone, 0.5 mg, q2h PRN  HYDROmorphone, 0.2 mg, q4h PRN  naloxone, 0.2 mg, q5 min PRN  naloxone, 0.2 mg, q5 min PRN  naloxone, 0.2 mg, q5 min PRN  naloxone, 0.2 mg, q5 min PRN  ondansetron ODT, 4 mg, q8h PRN   Or  ondansetron, 4 mg, q8h PRN  oxyCODONE, 10 mg, q4h PRN  oxyCODONE, 2.5 mg, q4h PRN  oxyCODONE, 5 mg, q6h PRN  promethazine, 25 mg, q6h PRN   Or  promethazine, 25 mg, q12h PRN        ==============  DIET  ==============  Dietary Orders (From admission, onward)       Start     Ordered    01/22/24 1702  Adult diet Regular  Diet effective now        Question:  Diet type  Answer:  Regular    01/22/24 1702    01/22/24 1334  May Participate in Room Service  Once        Question:  .  Answer:  Yes    01/22/24 1334                    ====== Assessment/Plan   =======    ASSESSMENT:  Principal Problem:    Arthritis of right hip  Active Problems:    HTN (hypertension)    ___________________________________________________    Right total Hip Replacement  HTN  DLP  Gout  Edited by: Berlin Bruce DO at 1/22/2024 2128    PLAN:  - Vitals stable.   - he was not on hctz prior.  His PCP was planning on starting in on follow-up.  Will dc it for now  - Will follow up on AM  labs tomorrow.    Appears hemodynamically stable at this time.        DVT Prophylaxis  As per ortho     SUMMARY/DISPOSITION  Hemodynamically stable.  Will follow.                Berlin Bruce, DO

## 2024-01-22 NOTE — ANESTHESIA PROCEDURE NOTES
Airway  Date/Time: 1/22/2024 9:50 AM  Urgency: elective    Airway not difficult    Staffing  Performed: JANET   Authorized by: Gilberto Campos MD    Performed by: JANET Muñiz  Patient location during procedure: OR    Indications and Patient Condition  Preoxygenated: yes  Mask difficulty assessment: 1 - vent by mask  Planned trial extubation    Final Airway Details  Final airway type: endotracheal airway      Successful airway: ETT  Cuffed: yes   Successful intubation technique: direct laryngoscopy  Facilitating devices/methods: intubating stylet  Blade: [place]  Blade size: #4  ETT size (mm): 8.0  Cormack-Lehane Classification: grade IIa - partial view of glottis  Placement verified by: capnometry   Measured from: lips  ETT to lips (cm): 21  Number of attempts at approach: 1

## 2024-01-22 NOTE — ANESTHESIA POSTPROCEDURE EVALUATION
Patient: Tye Hendrickson    Procedure Summary       Date: 01/22/24 Room / Location: [facility] OR 06 / Virtual [facility] OR    Anesthesia Start: 0940 Anesthesia Stop: 1223    Procedure: RIGHT TOTAL HIP REPLACEMENT WITH INTRA-OP PORTABLE X-RAY (Right: Hip) Diagnosis:       Arthritis of right hip      (Arthritis of right hip [M16.11])    Surgeons: Carloz Brasher MD Responsible Provider: Gilberto Campos MD    Anesthesia Type: general ASA Status: 3            Anesthesia Type: general    Vitals Value Taken Time   /83 01/22/24 1221   Temp 36.5 °C (97.7 °F) 01/22/24 1221   Pulse 71 01/22/24 1222   Resp 16 01/22/24 1221   SpO2 100 % 01/22/24 1222   Vitals shown include unvalidated device data.    Anesthesia Post Evaluation    Patient location during evaluation: PACU  Patient participation: complete - patient participated  Level of consciousness: awake and alert  Pain score: 3  Pain management: adequate  Airway patency: patent  Cardiovascular status: acceptable  Respiratory status: acceptable  Hydration status: acceptable  Postoperative Nausea and Vomiting: none        There were no known notable events for this encounter.

## 2024-01-22 NOTE — PROGRESS NOTES
Physical Therapy    Physical Therapy Evaluation    Patient Name: Tye Hendrickson  MRN: [id number]  Today's Date: 1/22/2024   Time Calculation  Start Time: 1432  Stop Time: 1458  Time Calculation (min): 26 min    Assessment/Plan   PT Assessment  End of Session Communication: Bedside nurse  End of Session Patient Position: Bed, 2 rail up, Alarm on  IP OR SWING BED PT PLAN  Inpatient or Swing Bed: Inpatient  PT Plan  PT Plan: Skilled PT  PT Frequency: 2 times per week (1-2 VISITS)  PT Discharge Recommendations: Low intensity level of continued care  PT - OK to Discharge: Yes      Subjective   General Visit Information:  General  Reason for Referral: R post THR  Referred By: Anshu  Past Medical History Relevant to Rehab: OA,HLD  Prior to Session Communication: Bedside nurse  Patient Position Received: Bed, 2 rail up, Alarm on  Home Living:  Home Living  Type of Home: House  Lives With: Spouse  Home Adaptive Equipment: None, Walker rolling or standard  Home Layout: One level  Home Access: Stairs to enter without rails (2 steps)  Prior Level of Function:  Prior Function Per Pt/Caregiver Report  Level of [place]: Independent with ADLs and functional transfers  Ambulatory Assistance: Independent  Precautions:  Precautions  LE Weight Bearing Status:  (FWB RLE)  Post-Surgical Precautions: Right hip precautions  Vital Signs:       Objective   Pain:  Pain Assessment  Pain Score: 2 (4/10 w/ amb)  Cognition:  Cognition  Overall Cognitive Status: Within Functional Limits    General Assessments:    Functional Assessments:  Bed Mobility  Bed Mobility: Yes (IND)    Transfers  Transfer: Yes (SBA)    Ambulation/Gait Training  Ambulation/Gait Training Performed: Yes ( ft/wh walker)  Extremity/Trunk Assessments:    Outcome Measures:  [facility] Basic Mobility  Turning from your back to your side while in a flat bed without using bedrails: None  Moving from lying on your back to sitting on the side of a flat bed without using  bedrails: None  Moving to and from bed to chair (including a wheelchair): A little  Standing up from a chair using your arms (e.g. wheelchair or bedside chair): A little  To walk in hospital room: A little  Climbing 3-5 steps with railing: Total  Basic Mobility - Total Score: 18    Encounter Problems       Encounter Problems (Active)       PT Problem       ledezma       Start:  01/22/24    Expected End:  01/23/24       Ind ledezma         gait/stairs       Start:  01/22/24    Expected End:  01/23/24       Amb ind wh walker  Ind/SBA stairs         There ex       Start:  01/22/24    Expected End:  01/23/24       10-30 reps LE's            Pain - Adult              Education Documentation  Mobility Training, taught by Griffin Dougherty, PT at 1/22/2024  3:30 PM.  Learner: Patient  Readiness: Acceptance  Method: Explanation  Response: Verbalizes Understanding    Education Comments  No comments found.

## 2024-01-22 NOTE — PROGRESS NOTES
Occupational Therapy    Evaluation    Patient Name: Tye Hendrickson  MRN: [id number]  Today's Date: 1/22/2024  Time Calculation  Start Time: 1441  Stop Time: 1458  Time Calculation (min): 17 min    Assessment  IP OT Assessment  Prognosis: Good  End of Session Communication: Bedside nurse  End of Session Patient Position: Bed, 2 rail up, Alarm on  Plan:  OT Frequency: Daily  OT Discharge Recommendations: Low intensity level of continued care    Subjective   Current Problem:  1. Pre-op testing  Staphylococcus aureus/MRSA colonization, Culture      2. Arthritis of right hip  Surgical Pathology Exam    Surgical Pathology Exam        General:  General  Reason for Referral: R post THR (OT eval/tx/impiaried funcitonal living skills, WBAT)  Referred By: Anshu  Past Medical History Relevant to Rehab: OA,HLD  Prior to Session Communication: Bedside nurse  Patient Position Received: Bed, 2 rail up, Alarm on  General Comment: full weight bearing  RLE/posterior hip precautions  Precautions:  LE Weight Bearing Status:  (FWB RLE)  Post-Surgical Precautions: Right hip precautions  Vital Signs:     Pain: 2/10 with rest and functional transfers sit to stand and 4/10 with functional mobility utilizing a wheeled walker       Objective   Cognition:  Overall Cognitive Status: Within Functional Limits           Home Living:  Type of Home: House  Lives With: Spouse  Home Adaptive Equipment: None, Walker rolling or standard  Home Layout: One level  Home Access: Stairs to enter without rails (2 steps, tub/shower combination with a chair and rails and hhs. Pt utilizes an elevated toliet seat with rails)   Prior Function:  Level of [place]: Independent with ADLs and functional transfers  Ambulatory Assistance: Independent  Hand Dominance: Right  IADL History:  Mode of Transportation:  (indep)  IADL Comments: Prior to admit: pt indep with adl/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  ADL:  ADL Comments: Pt has an adjustable bed at  home  Activity Tolerance:  Endurance:  (Pt demonstrated good endurance this date)  Bed Mobility/Transfers: Bed Mobility  Bed Mobility: Yes (IND)    Transfers  Transfer: Yes (SBA)      Ambulation/Gait Training:  Ambulation/Gait Training  Ambulation/Gait Training Performed:  (functional mobility with a  wheeled walker - contact guard home distances)  Modalities:     IADL's:   Mode of Transportation:  (indep)  IADL Comments: Prior to admit: pt indep with adl/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  Vision:  wfl  Sensation:wfl  Light Touch: No apparent deficits  Strength:  Strength Comments: strength below elbow wfl  Perception:wfl     Coordination:wfl      Hand Function:right     Extremities: RUE   RUE : Within Functional Limits and LUE   LUE: Within Functional Limits    Outcome Measures: [facility] Daily Activity  Putting on and taking off regular lower body clothing: A lot  Bathing (including washing, rinsing, drying): A lot  Putting on and taking off regular upper body clothing: None  Toileting, which includes using toilet, bedpan or urinal: A lot  Taking care of personal grooming such as brushing teeth: None  Eating Meals: None  Daily Activity - Total Score: 18      Education Documentation  Mobility Training, taught by Diane Chavez OT at 1/22/2024  5:17 PM.  Learner: Patient  Readiness: Acceptance  Method: Demonstration  Response: Demonstrated Understanding, Needs Reinforcement    Education Comments  No comments found.    Pt was educated and demonstrated good comprehension of  THR precautions as applied to adl/functional mobility  Goals:   Encounter Problems       Encounter Problems (Active)       impaired functional living skills       Pt will increase   upper body  bathing to mod indep/ indep and Lower body bathing  mod indep / indep increase and LE dressing to mod indep/Indep with adaptive equipment as neededOT Goal 1 (Progressing)       Start:  01/22/24    Expected End:  02/05/24             Pt will increase  functional transfers   sit to stand  to mod indep and  functional mobility  with a device to mod indep  chair/toliet/room to increase indep/safety in patients discharge environmentOT Goal 2 (Progressing)       Start:  01/22/24    Expected End:  02/05/24

## 2024-01-22 NOTE — OP NOTE
RIGHT TOTAL HIP REPLACEMENT WITH INTRA-OP PORTABLE X-RAY (R) Operative Note     Date: 2024  OR Location: [facility] OR    Name: Tye Hendrickson, : 1965, Age: 58 y.o., MRN: [id number], Sex: male    Diagnosis  Pre-op Diagnosis     * Arthritis of right hip [M16.11] Post-op Diagnosis     * Arthritis of right hip [M16.11]     Procedures  RIGHT TOTAL HIP REPLACEMENT WITH INTRA-OP PORTABLE X-RAY  63791 - ME ARTHRP ACETBLR/PROX FEM PROSTC AGRFT/ALGRFT  DePuy Camas femur size 9 high offset with a 36 mm +1.5 ceramic femoral head.  [place] sector 54 mm acetabulum with a +4 lateral neutral liner    Surgeons      * Carloz Brasher - Primary    Resident/Fellow/Other Assistant:  Surgeon(s) and Role: Linnea Leonard    Procedure Summary  Anesthesia: General  ASA: III  Anesthesia Staff: Anesthesiologist: Gilberto Campos MD  C-AA: JANET Muñiz  Estimated Blood Loss: 150mL    Indications: The patient has undergone extensive conservative treatment but has persistent severe debilitating pain.  Radiographs demonstrate advanced degenerative changes involving the hip with complete loss of joint space and osteophyte formation subchondral sclerosis and cystic change.  Treatment options of any no treatment reviewed and the decision was made to proceed with surgery.     Description of procedure: The patient was brought in the operating room.  General anesthesia was performed formed.  Positioning was done in the lateral decubitus position.  The patient was prepped and draped in the usual fashion.  A posterolateral approach was made to the hip.  Care taken to identify and protect the sciatic nerve throughout the procedure.  The short external tears and capsule were taken out and tagged for later repair.  The hip was dislocated.  There was advanced degenerative disease involving the hip.  Femoral neck cut was made 20 mm above the lesser trochanter with an oscillating saw based on preoperative templating.  The  acetabulum was exposed.  Labrum and soft tissue were removed.  Sequential reaming was done and trialing was done for the acetabular implant.  The acetabular implant was then impacted into place matching the native anteversion and in approximately 45 degrees abduction.  A very secure fit was obtained.  Additional fixation was done using a cancellous screw.  Trial liner was placed and then attention was turned to the femur.  Sequential reaming and broaching was done.  Trialing was then done.  Intraoperative x-ray was obtained demonstrating good implant positioning and fill of the femoral canal as well as leg length restoration.  The hip was stable past 90 degrees of flexion.  The hip was stable in extension and external rotation as well as flexion and external rotation.  The hip was stable and flexion and internal rotation at 90 degrees as well as at 70 degrees.  The decision was made to use these implants.  Attention was again turned to the acetabulum.  The whole eliminator was placed followed by the liner.  Attention was again turned to the femur and the femoral implant was impacted into place matching native anteversion and then the femoral head was applied and the hip was reduced and had similar stability of the trial implants.  The wound was copiously with antibiotic irrigation.  Hemostasis was carefully obtained.  The wound was closed in layers.  Sterile dressing was applied and they were taken the recovery room in stable condition.  The appropriate preoperative antibiotic was given within 1 hour then skin incision and antibiotics were ordered postoperatively to be completed within 24 hours.  DVT prophylaxis was ordered to start within 24 hours.  There is no complications.  The femoral head was sent for specimen.           Anesthesia Record               Intraprocedure I/O Totals          Intake    lactated Ringer's infusion 1400.00 mL    ceFAZolin in dextrose (iso-os) (Ancef) IVPB 2 g 100.00 mL    tranexamic  acid in NaCl,iso-os 1,000 mg/100 mL (10 mg/mL) IV 1,000 mg 200.00 mL    Total Intake 1700 mL       Output    Est. Blood Loss 300 mL    Total Output 300 mL       Net    Net Volume 1400 mL          Specimen:   ID Type Source Tests Collected by Time   1 : RIGHT HIP BONE AND TISSUE Tissue HIP ARTHROPLASTY RIGHT SURGICAL PATHOLOGY EXAM Carloz Brasher MD 1/22/2024 5745        Staff:   Circulator: Cornelia Chaudhry RN; Melissa Sesay RN  Scrub Person: Gregory Rivero; Jayjay Abbott         Drains and/or Catheters: * None in log *    Tourniquet Times:         Implants:  Implants       Type Name Action Serial No.      Joint Hip ACETABULAR CUP, SECTOR, GRIPTON, SIZE 54MM - [id number] Implanted      Screw SCREW CANCELLOUS 6.5 X 25 - [id number] Implanted      Joint Hip LINER, ALTRX, +4 NEUTRAL, 36 X 54MM - [id number] Implanted      Joint Hip HIP STEM, SUMMIT DUOFIX 9 HI - [id number] Implanted      Joint STOP POSITIVE APEX HOLE ELIM - [id number] Implanted      Joint Hip FEMORAL HEAD, CERAMIC 36 +1.5 - [id number] Implanted               Attending Attestation: I performed the procedure.    Carloz Brasher  Phone Number: [phone]

## 2024-01-22 NOTE — DISCHARGE INSTRUCTIONS
CONSTIPATION MANAGEMENT AT HOME:  Constipation is common after Joint Replacement surgery for several reasons.  A common side effect of all pain medication is constipation.  A decrease in your activity as well as change in your normal diet & appetite all contribute to the constipation.  At home, it is important to take a daily stool softener such as Colace, Milk of magnesium or senokot while you are taking pain medications to help manage the constipation.  In addition, if you do NOT have a bowel movement within 72 hours of discharge, add a laxative such as miralax.  Miralax normally takes 2 to 3 days to work so you will not receive immediate results.  It is also important to drink plenty of fluids, especially water.  Stool softeners & laxatives need water to work properly.  We also suggest you increase your fiber intake either with foods high in fiber or with some type of supplement such as benefiber or metamucil.    Foods that are high in fiber include:     Fruits such as pears, strawberries, avocado, apples, raspberries, bananas, kiwis   Vegetables such as carrots, beets, broccoli, brussel sprouts, spinach   Lentils and kidney beans   Split peas and chickpeas   Oats, almonds, citlalli seeds, and sweet potatoes      ACTIVITY AT HOME:  You will need to continue with your therapy after discharge either with in home therapy or at an outpatient facility.  Most patients initially do in home therapy for about two weeks then transition to outpatient therapy.  You have freedom of choice in which in home therapy and outpatient facility you plan to use.  Most in home therapy sessions will begin within 24 to 48 hours after discharge.  After about two weeks of in home therapy, you will most likely to ready for outpatient therapy sessions.  Outpatient therapy is normally twice a week for weeks.  While you are at home it is important that you perform the exercises the therapist went over with you in the hospital 2 to 3 times a day.   After you complete your exercises, apply ice to your incision to help with swelling and pain.  You should also be getting up and walking around your house every hour with the use of your walker.  While at rest, perform, ankle pumps on each foot at least ten times.  This will help with the swelling as well as decrease your risk for blood clots.  ALWAYS USE YOUR WALKER WITH ANY TYPE OF ACTIVITY!!!!    WOUND CARE:  The bandage on your incision will stay in place for 7 days.  The bandage is waterproof so you may shower with the bandage in place.  No need to wrap or cover it.  Some drainage on your bandage is perfectly normal.  Home health care will assist you with bandage removal.  Once the bandage is removed, your incision can be left open to air if there is no drainage present.  If your incision is draining at the time of bandage removal, home health care will assist you with applying a new gauze bandage.  Gauze bandages are NOT waterproof.    Swelling in your operative extremity will peak about 72 hours after surgery and can last for weeks.  To help with the swelling make sure you are elevating your leg and apply ice frequently.  In addition, you will receive compression stockings upon discharge from the hospital.  Make sure you are wearing these stockings on both your legs at home.  Generally we instruct you to wear during the day and remove at bedtime for the next 4 weeks.      *Apply ice to incision at least 5 times daily    *Wear bilateral buzz hose stockings to lower extremities for next 4 weeks    *Do NOT take any non steroidal anti-inflammatory medications such as motrin, aleve, ibuprofen, celebrex or meloxicam    *Take daily stool softener.  If you experience any diarrhea, stop medication    *If you need a refill on your pain medication, contact your surgeon's office Monday through Thursday with as least 24 hours notice    If you have any questions or concerns please contact the Joint  Carmita  Siomara at:  Office: [phone]   Cell:  [phone]  Email:  [email or web address]

## 2024-01-22 NOTE — ANESTHESIA PREPROCEDURE EVALUATION
Patient: Tye Hendrickson    Procedure Information       Date/Time: 01/22/24 0930    Procedure: RIGHT TOTAL HIP REPLACEMENT WITH INTRA-OP PORTABLE X-RAY (Right: Hip)    Location: [facility] 06 / [facility]    Surgeons: Carloz Brasher MD            Relevant Problems   Anesthesia (within normal limits)   (-) PONV (postoperative nausea and vomiting)      Cardiovascular   (+) HTN (hypertension)   (+) Mixed hyperlipidemia      Other   (+) Arthritis of right hip       Clinical information reviewed:    Allergies  Meds               NPO Detail:  NPO/Void Status  Date of Last Liquid: 01/21/24  Time of Last Liquid: 1900  Date of Last Solid: 01/21/24  Time of Last Solid: 1900         Physical Exam    Airway  Mallampati: II  TM distance: >3 FB  Neck ROM: full     Cardiovascular - normal exam  Rhythm: regular  Rate: normal     Dental    Pulmonary - normal exam     Abdominal            Anesthesia Plan    History of general anesthesia?: yes  History of complications of general anesthesia?: no    ASA 3     general     The patient is not a current smoker.  Education provided regarding risk of obstructive sleep apnea.  intravenous induction   Anesthetic plan and risks discussed with patient.    Plan discussed with CRNA, CAA and attending.

## 2024-01-23 ENCOUNTER — DOCUMENTATION (OUTPATIENT)
Dept: HOME HEALTH SERVICES | Facility: HOME HEALTH | Age: 59
End: 2024-01-23

## 2024-01-23 ENCOUNTER — HOME HEALTH ADMISSION (OUTPATIENT)
Dept: HOME HEALTH SERVICES | Facility: HOME HEALTH | Age: 59
End: 2024-01-23
Payer: COMMERCIAL

## 2024-01-23 VITALS
WEIGHT: 210.1 LBS | DIASTOLIC BLOOD PRESSURE: 107 MMHG | OXYGEN SATURATION: 95 % | RESPIRATION RATE: 20 BRPM | HEART RATE: 118 BPM | TEMPERATURE: 99.9 F | HEIGHT: 73 IN | BODY MASS INDEX: 27.85 KG/M2 | SYSTOLIC BLOOD PRESSURE: 199 MMHG

## 2024-01-23 LAB
ANION GAP SERPL CALC-SCNC: 11 MMOL/L (ref 10–20)
BUN SERPL-MCNC: 17 MG/DL (ref 6–23)
CALCIUM SERPL-MCNC: 8.5 MG/DL (ref 8.6–10.3)
CHLORIDE SERPL-SCNC: 101 MMOL/L (ref 98–107)
CO2 SERPL-SCNC: 26 MMOL/L (ref 21–32)
CREAT SERPL-MCNC: 1.07 MG/DL (ref 0.5–1.3)
EGFRCR SERPLBLD CKD-EPI 2021: 79 ML/MIN/1.73M*2
ERYTHROCYTE [DISTWIDTH] IN BLOOD BY AUTOMATED COUNT: 12.5 % (ref 11.5–14.5)
GLUCOSE SERPL-MCNC: 132 MG/DL (ref 74–99)
HCT VFR BLD AUTO: 35.6 % (ref 41–52)
HGB BLD-MCNC: 12.1 G/DL (ref 13.5–17.5)
MCH RBC QN AUTO: 30.3 PG (ref 26–34)
MCHC RBC AUTO-ENTMCNC: 34 G/DL (ref 32–36)
MCV RBC AUTO: 89 FL (ref 80–100)
NRBC BLD-RTO: 0 /100 WBCS (ref 0–0)
PLATELET # BLD AUTO: 218 X10*3/UL (ref 150–450)
POTASSIUM SERPL-SCNC: 4.1 MMOL/L (ref 3.5–5.3)
RBC # BLD AUTO: 4 X10*6/UL (ref 4.5–5.9)
SODIUM SERPL-SCNC: 134 MMOL/L (ref 136–145)
WBC # BLD AUTO: 11.6 X10*3/UL (ref 4.4–11.3)

## 2024-01-23 PROCEDURE — 97110 THERAPEUTIC EXERCISES: CPT | Mod: GP,CQ

## 2024-01-23 PROCEDURE — 97530 THERAPEUTIC ACTIVITIES: CPT | Mod: CO,GO

## 2024-01-23 PROCEDURE — 2500000001 HC RX 250 WO HCPCS SELF ADMINISTERED DRUGS (ALT 637 FOR MEDICARE OP): Performed by: ORTHOPAEDIC SURGERY

## 2024-01-23 PROCEDURE — 97116 GAIT TRAINING THERAPY: CPT | Mod: GP,CQ

## 2024-01-23 PROCEDURE — 85027 COMPLETE CBC AUTOMATED: CPT | Performed by: ORTHOPAEDIC SURGERY

## 2024-01-23 PROCEDURE — 2500000004 HC RX 250 GENERAL PHARMACY W/ HCPCS (ALT 636 FOR OP/ED): Performed by: ORTHOPAEDIC SURGERY

## 2024-01-23 PROCEDURE — 80048 BASIC METABOLIC PNL TOTAL CA: CPT | Performed by: ORTHOPAEDIC SURGERY

## 2024-01-23 PROCEDURE — 7100000011 HC EXTENDED STAY RECOVERY HOURLY - NURSING UNIT

## 2024-01-23 PROCEDURE — 36415 COLL VENOUS BLD VENIPUNCTURE: CPT | Performed by: ORTHOPAEDIC SURGERY

## 2024-01-23 PROCEDURE — 2500000004 HC RX 250 GENERAL PHARMACY W/ HCPCS (ALT 636 FOR OP/ED): Performed by: NURSE PRACTITIONER

## 2024-01-23 PROCEDURE — 99231 SBSQ HOSP IP/OBS SF/LOW 25: CPT | Performed by: CLINICAL NURSE SPECIALIST

## 2024-01-23 PROCEDURE — 99231 SBSQ HOSP IP/OBS SF/LOW 25: CPT | Performed by: INTERNAL MEDICINE

## 2024-01-23 RX ORDER — HYDRALAZINE HYDROCHLORIDE 20 MG/ML
5 INJECTION INTRAMUSCULAR; INTRAVENOUS ONCE
Status: COMPLETED | OUTPATIENT
Start: 2024-01-23 | End: 2024-01-23

## 2024-01-23 RX ORDER — ASPIRIN 81 MG/1
81 TABLET ORAL 2 TIMES DAILY
Qty: 60 TABLET | Refills: 0 | Status: SHIPPED | OUTPATIENT
Start: 2024-01-23 | End: 2024-02-22

## 2024-01-23 RX ORDER — OXYCODONE HYDROCHLORIDE 5 MG/1
5 TABLET ORAL EVERY 6 HOURS PRN
Qty: 28 TABLET | Refills: 0 | Status: SHIPPED | OUTPATIENT
Start: 2024-01-23 | End: 2024-01-30

## 2024-01-23 RX ORDER — ACETAMINOPHEN 325 MG/1
650 TABLET ORAL EVERY 6 HOURS SCHEDULED
Start: 2024-01-23

## 2024-01-23 RX ORDER — DOCUSATE SODIUM 100 MG/1
100 CAPSULE, LIQUID FILLED ORAL DAILY
Start: 2024-01-23

## 2024-01-23 RX ORDER — POLYETHYLENE GLYCOL 3350 17 G/17G
17 POWDER, FOR SOLUTION ORAL DAILY PRN
Start: 2024-01-23

## 2024-01-23 RX ADMIN — OXYCODONE HYDROCHLORIDE 10 MG: 5 TABLET ORAL at 06:48

## 2024-01-23 RX ADMIN — ACETAMINOPHEN 650 MG: 325 TABLET ORAL at 06:48

## 2024-01-23 RX ADMIN — HYDRALAZINE HYDROCHLORIDE 5 MG: 20 INJECTION INTRAMUSCULAR; INTRAVENOUS at 08:12

## 2024-01-23 RX ADMIN — CEFAZOLIN SODIUM 2 G: 2 INJECTION, SOLUTION INTRAVENOUS at 02:22

## 2024-01-23 RX ADMIN — ASPIRIN 81 MG: 81 TABLET, COATED ORAL at 08:12

## 2024-01-23 RX ADMIN — DOCUSATE SODIUM 100 MG: 100 CAPSULE, LIQUID FILLED ORAL at 08:12

## 2024-01-23 RX ADMIN — POLYETHYLENE GLYCOL 3350 17 G: 17 POWDER, FOR SOLUTION ORAL at 08:12

## 2024-01-23 RX ADMIN — ACETAMINOPHEN 650 MG: 325 TABLET ORAL at 12:39

## 2024-01-23 ASSESSMENT — COGNITIVE AND FUNCTIONAL STATUS - GENERAL
MOBILITY SCORE: 20
HELP NEEDED FOR BATHING: A LITTLE
DAILY ACTIVITIY SCORE: 21
STANDING UP FROM CHAIR USING ARMS: A LITTLE
CLIMB 3 TO 5 STEPS WITH RAILING: A LITTLE
DRESSING REGULAR LOWER BODY CLOTHING: A LITTLE
TOILETING: A LITTLE
MOVING TO AND FROM BED TO CHAIR: A LITTLE
WALKING IN HOSPITAL ROOM: A LITTLE

## 2024-01-23 ASSESSMENT — PAIN SCALES - GENERAL
PAINLEVEL_OUTOF10: 7
PAINLEVEL_OUTOF10: 0 - NO PAIN
PAINLEVEL_OUTOF10: 2
PAINLEVEL_OUTOF10: 5 - MODERATE PAIN

## 2024-01-23 ASSESSMENT — PAIN - FUNCTIONAL ASSESSMENT
PAIN_FUNCTIONAL_ASSESSMENT: 0-10
PAIN_FUNCTIONAL_ASSESSMENT: 0-10

## 2024-01-23 NOTE — PROGRESS NOTES
"Subjective  Pt OOB in chair. BP earlier was 151/102, on recheck during my exam it's 199/107. He denies pain, SOB, nausea, chest pain, HA and vision changes. He says he has a longstanding hx of white-coat HTN, saying, \"I'm terrified to get my blood pressure checked\" and \"I won't sleep for a week if I know I'm going to get it checked.\" He gets so nervous his arms and hands shake. HR was 118 earlier. He was prescribed Dyazide 1/21/24 and says it was picked up at the pharmacy; his PCP wanted him to start it after his surgery. He says his SBP at baseline is ~150 but spikes to 170/110 when he's at the Dr's office.    Objective    Vitals:    01/23/24 0844   BP: (!) 199/107   Pulse:    Resp:    Temp:    SpO2:        Vitals:    01/23/24 0900   Weight: 95.3 kg (210 lb 1.6 oz)       Scheduled medications  acetaminophen, 650 mg, oral, q6h SAIGE  allopurinol, 100 mg, oral, Daily  aspirin, 81 mg, oral, BID  docusate sodium, 100 mg, oral, BID  polyethylene glycol, 17 g, oral, Daily  rosuvastatin, 20 mg, oral, Daily      Continuous medications  lactated Ringer's, 100 mL/hr, Last Rate: 100 mL/hr (01/23/24 0902)  oxygen, 2 L/min      PRN medications  PRN medications: diphenhydrAMINE, diphenhydrAMINE, HYDROmorphone, HYDROmorphone, HYDROmorphone, naloxone, naloxone, naloxone, naloxone, ondansetron ODT **OR** ondansetron, oxyCODONE, oxyCODONE, oxyCODONE, promethazine **OR** promethazine    Results for orders placed or performed during the hospital encounter of 01/22/24 (from the past 24 hour(s))   CBC   Result Value Ref Range    WBC 11.6 (H) 4.4 - 11.3 x10*3/uL    nRBC 0.0 0.0 - 0.0 /100 WBCs    RBC 4.00 (L) 4.50 - 5.90 x10*6/uL    Hemoglobin 12.1 (L) 13.5 - 17.5 g/dL    Hematocrit 35.6 (L) 41.0 - 52.0 %    MCV 89 80 - 100 fL    MCH 30.3 26.0 - 34.0 pg    MCHC 34.0 32.0 - 36.0 g/dL    RDW 12.5 11.5 - 14.5 %    Platelets 218 150 - 450 x10*3/uL   Basic metabolic panel   Result Value Ref Range    Glucose 132 (H) 74 - 99 mg/dL    Sodium 134 " (L) 136 - 145 mmol/L    Potassium 4.1 3.5 - 5.3 mmol/L    Chloride 101 98 - 107 mmol/L    Bicarbonate 26 21 - 32 mmol/L    Anion Gap 11 10 - 20 mmol/L    Urea Nitrogen 17 6 - 23 mg/dL    Creatinine 1.07 0.50 - 1.30 mg/dL    eGFR 79 >60 mL/min/1.73m*2    Calcium 8.5 (L) 8.6 - 10.3 mg/dL       Constitutional: Well developed, awake, alert, oriented x3, no acute distress, cooperative   Eyes: EOMI, clear sclera   ENMT: mucous membranes moist, no lesions seen   Head/Neck: Neck supple, no apparent injury, head atraumatic   Respiratory/Thorax: CTAB, good chest expansion, respirations even and unlabored   Cardiovascular: Regular rate and rhythm, tachycardic, no murmurs/rubs/gallops, normal S1 and S 2   Gastrointestinal: Abdomen nondistended, soft, nontender, +BS, no bruits   Musculoskeletal: ROM intact, no joint swelling, normal  strength, RLE deferred   Extremities: no cyanosis, edema, contusions or clubbing   Neurological: no focal deficit, pt alert and oriented x3   Psychological: Appropriate affect and behavior, pleasant   Skin: Warm and dry, no lesions, no rashes       Past Medical History:   Diagnosis Date    Gout     HTN (hypertension)     Hyperlipidemia     Nephrolithiasis     White coat syndrome with hypertension         Assessment/Plan  Osteoarthritis POD #1 right total hip replacement     - management per primary surgery service      - continue pain and bowel regimen, pt advised to stay ahead of pain  White coat HTN     - longstanding hx of white-coat HTN with severe anxiety     - pt was prescribed Dyazide 1/21/24, PCP wanted him to start it after surgery     - will give hydralazine 5 mg IV x1, pt is medically cleared to discharge home with close followup for his BP, pt should start Dyazide when he gets home today  HLD, continue home Crestor  Gout, continue home allopurinol  DVT ppx     - BASA BID per ortho protocol       Elise Aly, CNP  Hospital Medicine

## 2024-01-23 NOTE — PROGRESS NOTES
Tye Hendrickson is a 58 y.o. male on day 0 of admission presenting with Arthritis of right hip.    Subjective   Patient c/o moderate amount of incisional pain  Denies any c/o nausea tolerating regular diet well        Objective     Physical Exam  Aquacel dressing D&I to right hip incision.    Neurovascular status WNL RLE     Last Recorded Vitals  Blood pressure (!) 151/102, pulse (!) 118, temperature 37.7 °C (99.9 °F), resp. rate 18, weight 95.3 kg (210 lb), SpO2 95 %.  Intake/Output last 3 Shifts:  I/O last 3 completed shifts:  In: 3492 (36.7 mL/kg) [P.O.:360; I.V.:2732 (28.7 mL/kg); IV Piggyback:400]  Out: 1300 (13.6 mL/kg) [Urine:1000 (0.3 mL/kg/hr); Blood:300]  Weight: 95.3 kg     Relevant Results      Scheduled medications  acetaminophen, 650 mg, oral, q6h SAIGE  allopurinol, 100 mg, oral, Daily  aspirin, 81 mg, oral, BID  docusate sodium, 100 mg, oral, BID  polyethylene glycol, 17 g, oral, Daily  rosuvastatin, 20 mg, oral, Daily      Continuous medications  lactated Ringer's, 100 mL/hr, Last Rate: 100 mL/hr (01/23/24 0345)  oxygen, 2 L/min      PRN medications  PRN medications: diphenhydrAMINE, diphenhydrAMINE, HYDROmorphone, HYDROmorphone, HYDROmorphone, naloxone, naloxone, naloxone, naloxone, ondansetron ODT **OR** ondansetron, oxyCODONE, oxyCODONE, oxyCODONE, promethazine **OR** promethazine  Results for orders placed or performed during the hospital encounter of 01/22/24 (from the past 24 hour(s))   CBC   Result Value Ref Range    WBC 11.6 (H) 4.4 - 11.3 x10*3/uL    nRBC 0.0 0.0 - 0.0 /100 WBCs    RBC 4.00 (L) 4.50 - 5.90 x10*6/uL    Hemoglobin 12.1 (L) 13.5 - 17.5 g/dL    Hematocrit 35.6 (L) 41.0 - 52.0 %    MCV 89 80 - 100 fL    MCH 30.3 26.0 - 34.0 pg    MCHC 34.0 32.0 - 36.0 g/dL    RDW 12.5 11.5 - 14.5 %    Platelets 218 150 - 450 x10*3/uL   Basic metabolic panel   Result Value Ref Range    Glucose 132 (H) 74 - 99 mg/dL    Sodium 134 (L) 136 - 145 mmol/L    Potassium 4.1 3.5 - 5.3 mmol/L    Chloride 101  98 - 107 mmol/L    Bicarbonate 26 21 - 32 mmol/L    Anion Gap 11 10 - 20 mmol/L    Urea Nitrogen 17 6 - 23 mg/dL    Creatinine 1.07 0.50 - 1.30 mg/dL    eGFR 79 >60 mL/min/1.73m*2    Calcium 8.5 (L) 8.6 - 10.3 mg/dL                            Assessment/Plan   Principal Problem:    Arthritis of right hip  Active Problems:    HTN (hypertension)     Continue therapy PT/OT with 100% wBS and standard posterior hip precautions.  Plan discharge home with [facility] later today if patient safe and medically stable.  Begin aspirin 81mg twice a day this am.  Bilateral SCDs to be worn while patient in bed. Encouraged ankle pumps Q1 WA.  Increase patient's activity as tolerated  Continue routine tylenol with ice therapy and prn oxycodone for adequate pain control.  Patient's hemoglobin level stable at 12.1.  Na+ level slightly decreased at 134.  Patient hypertensive this am.  Will defer to medical team for treatment.             Carmita Stringer, APRN-CNS

## 2024-01-23 NOTE — SIGNIFICANT EVENT
Discussed discharge plan with patient.  For discharge home today with [facility] follow up.  Patient has no preference in which agency he utilizes and is aware referral will be made to [facility] for SOC tomorrow.  Patient agreeable.    Reviewed discharge instructions with patient.  Patient verbalizes understanding of activity, wound care, home going medications, pain medications and follow up care.  [facility] form and pain medication administration form provided.

## 2024-01-23 NOTE — CARE PLAN
Problem: Pain - Adult  Goal: Verbalizes/displays adequate comfort level or baseline comfort level  1/23/2024 1310 by Luz Riojas RN  Outcome: Adequate for Discharge  1/23/2024 0900 by Luz Riojas RN  Outcome: Progressing     Problem: Pain - Adult  Goal: Verbalizes/displays adequate comfort level or baseline comfort level  1/23/2024 1310 by Luz Riojas RN  Outcome: Adequate for Discharge  1/23/2024 0900 by Luz Rioajs RN  Outcome: Progressing

## 2024-01-23 NOTE — DISCHARGE SUMMARY
"Discharge Diagnosis  Arthritis of right hip  Hypertension    Issues Requiring Follow-Up  Elevated BP     Test Results Pending At Discharge  Pending Labs       Order Current Status    Surgical Pathology Exam In process            Hospital Course   Patient is a 58 year old male with severe osteoarthritis of right hip.  On 1/22/24, patient underwent uncomplicated right total hip replacement via posterior approach by Dr. Brasher.  General anesthesia was utilized and estimated blood loss intraoperatively was 150cc.  Patient received one dose of IV antibiotics preoperatively and 2 additional doses were administered post operatively.  Hospitalist service was placed on consult to help assist with post op medical management.  For DVT prophylaxis, patient was ordered aspirin 81mg twice a day and bilateral SCDs.  Physical and occupational therapy were initiated with full weight bearing status and standard posterior hip precautions.  Patient progressed well with therapy.  His hemoglobin level was stable at 12.1 and he remained afebrile.  Patient was hypertensive postoperatively with highest BP noted at 199/107.  Patient was give one time IV dose of 5mg hydrazaline.  Patient is noted to have \"white coat\" syndrome and was prescribed by his primary care physician dyazide to begin post operatively.  On POD #1, patient was medically stable and safe for discharge home with [facility] follow up.  At time of discharge, patient was ambulating 90 feet with use of wheeled walker and stand by assistance.  He was also able to demonstrate safe stair climbing with stand by assistance.  Patient was instructed to continue to take aspirin 81mg twice a a day at home for total of 30 days.  In addition, bilateral buzz hose compression stockings were given along with instructions to wear at home for 4 weeks.  For pain management, oxycodone script was provided.  Anticipate need for extended use of narcotic medications as well as higher MED requirements based " on usual post operative incisional pain following total joint replacement surgery.  Dressing to right hip incision will be removed on POD #7 and suture tails to be removed on POD #14.  Patient will follow up in office with Dr Brasher in 4 weeks.      Pertinent Physical Exam At Time of Discharge  Physical Exam  Aquacel dressing D&I to right hip incision  Neurovascular status WNL RLE    Home Medications     Medication List      START taking these medications     acetaminophen 325 mg tablet; Commonly known as: Tylenol; Take 2 tablets   (650 mg) by mouth every 6 hours.   aspirin 81 mg EC tablet; Take 1 tablet (81 mg) by mouth 2 times a day   for 60 doses.   docusate sodium 100 mg capsule; Commonly known as: Colace; Take 1   capsule (100 mg) by mouth once daily.   oxyCODONE 5 mg immediate release tablet; Commonly known as: Roxicodone;   Take 1 tablet (5 mg) by mouth every 6 hours if needed for severe pain (7 -   10) for up to 7 days.   polyethylene glycol 17 gram packet; Commonly known as: Glycolax,   Miralax; Take 17 g by mouth once daily as needed (take dose if you do NOT   have bowel movement by Friday Jan 26th).     CONTINUE taking these medications     allopurinol 100 mg tablet; Commonly known as: Zyloprim   multivitamin tablet   rosuvastatin 20 mg tablet; Commonly known as: Crestor; Take 1 tablet (20   mg) by mouth once daily.     ASK your doctor about these medications     triamterene-hydrochlorothiazid 37.5-25 mg capsule; Commonly known as:   Dyazide; Take 1 capsule by mouth once daily in the morning.       Outpatient Follow-Up  Future Appointments   Date Time Provider Department Center   1/25/2024 To Be Determined Briseida Waite PT [facility]   1/26/2024 To Be Determined Kely Ortiz OT [facility]   2/9/2024  9:30 AM Shan Pagan MD [id number]   2/29/2024  1:00 PM Carloz Brasher MD [id number]       Carmita Stringer, APRN-CNS

## 2024-01-23 NOTE — PROGRESS NOTES
"Physical Therapy    Physical Therapy Treatment    Patient Name: Tye Hendrickson  MRN: [id number]  Today's Date: 1/23/2024  Time Calculation  Start Time: 0915  Stop Time: 0948  Time Calculation (min): 33 min       Assessment/Plan         PT Plan  PT Plan: Skilled PT  PT Frequency: 2 times per week (1-2 VISITS)  PT Discharge Recommendations: Low intensity level of continued care  PT - OK to Discharge: Yes      General Visit Information:   PT  Visit  PT Received On: 01/23/24  General  Patient Position Received: Up in chair, Alarm off, not on at start of session  General Comment:  (Pt pleasant and willing to participate in therapy session.  Seated in chair, no alarm, cold pack provided for R hip, call bell in reach post session.)    Subjective   Precautions:  Precautions  Precautions Comment:  (Full weight bearing RLE/posterior hip precautions)  Vital Signs:       Objective   Pain:  Pain Assessment  Pain Assessment: 0-10  Pain Score: 5 - Moderate pain (Pt reports 5/10 pain post therapy session.  RN admin pain meds prior to session.)           Treatments:  Therapeutic Exercise  Therapeutic Exercise Performed: Yes (Pt performed ther ex in order to improve functional mobility consisting of supine BLE AP,  GS; RLE QS, HS, SAQ, hip ADD, seated  LAQ x20 reps.)    Bed Mobility  Bed Mobility: Yes (Pt performed sit<>sup/sup<>sit independently.)    Ambulation/Gait Training  Ambulation/Gait Training Performed: Yes (Gait training 90'x2 with FWW and SBA/supervision; presents step-through gait pattern.)  Transfers  Transfer: Yes (Pt performed sit<.stand with FWW with SBA/supervision.)    Stairs  Stairs: Yes (Pt neg 6\" platform step with FWW and SBA; v/c required for proper sequencing.)    Outcome Measures:  [facility] Basic Mobility  Turning from your back to your side while in a flat bed without using bedrails: None  Moving from lying on your back to sitting on the side of a flat bed without using bedrails: None  Moving to and from bed to " chair (including a wheelchair): A little  Standing up from a chair using your arms (e.g. wheelchair or bedside chair): A little  To walk in hospital room: A little  Climbing 3-5 steps with railing: A little  Basic Mobility - Total Score: 20    Education Documentation  Mobility Training, taught by Keivn Mitchell PTA at 1/23/2024 12:32 PM.  Learner: Patient  Readiness: Acceptance  Method: Explanation  Response: Verbalizes Understanding    Education Comments  No comments found.        OP EDUCATION:       Encounter Problems       Encounter Problems (Active)       PT Problem       ledezma (Progressing)       Start:  01/22/24    Expected End:  01/25/24       Ind ledezma         gait/stairs (Progressing)       Start:  01/22/24    Expected End:  01/25/24       Amb ind wh walker  Ind/SBA stairs         There ex (Progressing)       Start:  01/22/24    Expected End:  01/25/24       10-30 reps LE's            Pain - Adult

## 2024-01-23 NOTE — CARE PLAN
The patient's goals for the shift include Pain control,    The clinical goals for the shift include pain management      Problem: Pain - Adult  Goal: Verbalizes/displays adequate comfort level or baseline comfort level  Outcome: Progressing     Problem: Safety - Adult  Goal: Free from fall injury  Outcome: Progressing     Problem: Discharge Planning  Goal: Discharge to home or other facility with appropriate resources  Outcome: Progressing     Problem: Chronic Conditions and Co-morbidities  Goal: Patient's chronic conditions and co-morbidity symptoms are monitored and maintained or improved  Outcome: Progressing     Problem: Pain  Goal: Takes deep breaths with improved pain control throughout the shift  Outcome: Progressing  Goal: Turns in bed with improved pain control throughout the shift  Outcome: Progressing  Goal: Walks with improved pain control throughout the shift  Outcome: Progressing  Goal: Performs ADL's with improved pain control throughout shift  Outcome: Progressing  Goal: Participates in PT with improved pain control throughout the shift  Outcome: Progressing  Goal: Free from opioid side effects throughout the shift  Outcome: Progressing  Goal: Free from acute confusion related to pain meds throughout the shift  Outcome: Progressing

## 2024-01-23 NOTE — PROGRESS NOTES
Occupational Therapy    OT Treatment    Patient Name: Tye Hendrickson  MRN: [id number]  Today's Date: 1/23/2024  Time Calculation  Start Time: 0912  Stop Time: 1025  Time Calculation (min): 73 min         Assessment:        Plan:  OT Frequency: Daily  OT Discharge Recommendations: Low intensity level of continued care       Subjective   Previous Visit Info:              Objective            Activities of Daily Living: instructed pt. In adaptive equipment use for increased ind. With adl's.  Pt. Able to thread legs through pants with sueprvision using ae and required sba for balance while standing to hike pants over hips.  Instructred pt. In tech to don buzz hose.  Pt. Dependent to complete task will have assistance at home.  Functional Standing Tolerance:  Time:  (pt. tolerated 3.0 min. of static stand balance with sba while completing grooming tasks.)  Bed Mobility/Transfers:      Car Transfers  Car Transfers:  (reviewed front seat suv trasnfer with pt.  pt. verbalized good understanding of tech.  pt. able to simulate transfer with min a.)               Outcome Measures:[facility] Daily Activity  Putting on and taking off regular lower body clothing: A little  Bathing (including washing, rinsing, drying): A little  Putting on and taking off regular upper body clothing: None  Toileting, which includes using toilet, bedpan or urinal: A little  Taking care of personal grooming such as brushing teeth: None  Eating Meals: None  Daily Activity - Total Score: 21        Education Documentation  No documentation found.  Education Comments  No comments found.        OP EDUCATION:  Education  Diagnosis and Precautions:  (reviewed thr prec with pt.  pt. verbalized ghood understanding of info.)    Goals:  Encounter Problems       Encounter Problems (Active)       impaired functional living skills       Pt will increase   upper body  bathing to mod indep/ indep and Lower body bathing  mod indep / indep increase and LE dressing to mod  indep/Indep with adaptive equipment as neededOT Goal 1 (Progressing)       Start:  01/22/24    Expected End:  01/25/24             Pt will increase functional transfers   sit to stand  to mod indep and  functional mobility  with a device to mod indep  chair/toliet/room to increase indep/safety in patients discharge environmentOT Goal 2 (Progressing)       Start:  01/22/24    Expected End:  01/25/24

## 2024-01-23 NOTE — HH CARE COORDINATION
Home Care received a Referral for Physical Therapy and Occupational Therapy. We have processed the referral for a Start of Care on 1/25/2024.     If you have any questions or concerns, please feel free to contact us at [phone]. Follow the prompts, enter your five digit zip code, and you will be directed to your care team on [facility] 2.

## 2024-01-25 ENCOUNTER — HOME CARE VISIT (OUTPATIENT)
Dept: HOME HEALTH SERVICES | Facility: HOME HEALTH | Age: 59
End: 2024-01-25
Payer: COMMERCIAL

## 2024-01-25 ENCOUNTER — TELEPHONE (OUTPATIENT)
Dept: ORTHOPEDICS | Facility: HOSPITAL | Age: 59
End: 2024-01-25
Payer: COMMERCIAL

## 2024-01-25 PROCEDURE — G0151 HHCP-SERV OF PT,EA 15 MIN: HCPCS

## 2024-01-25 PROCEDURE — 0023 HH SOC

## 2024-01-26 ENCOUNTER — HOME CARE VISIT (OUTPATIENT)
Dept: HOME HEALTH SERVICES | Facility: HOME HEALTH | Age: 59
End: 2024-01-26
Payer: COMMERCIAL

## 2024-01-26 LAB
LABORATORY COMMENT REPORT: NORMAL
PATH REPORT.FINAL DX SPEC: NORMAL
PATH REPORT.GROSS SPEC: NORMAL
PATH REPORT.RELEVANT HX SPEC: NORMAL
PATH REPORT.TOTAL CANCER: NORMAL

## 2024-01-26 SDOH — ECONOMIC STABILITY: HOUSING INSECURITY: HOME SAFETY: THA, FALL PRECAUTIONS

## 2024-01-26 SDOH — HEALTH STABILITY: PHYSICAL HEALTH: EXERCISE TYPE: THA PROTOCOL

## 2024-01-26 ASSESSMENT — ENCOUNTER SYMPTOMS
PERSON REPORTING PAIN: PATIENT
PAIN: 1
MUSCLE WEAKNESS: 1
PAIN LOCATION: RIGHT HIP
OCCASIONAL FEELINGS OF UNSTEADINESS: 0
LIMITED RANGE OF MOTION: 1

## 2024-01-26 ASSESSMENT — ACTIVITIES OF DAILY LIVING (ADL)
OASIS_M1830: 01
ENTERING_EXITING_HOME: MINIMUM ASSIST

## 2024-01-29 ENCOUNTER — HOME CARE VISIT (OUTPATIENT)
Dept: HOME HEALTH SERVICES | Facility: HOME HEALTH | Age: 59
End: 2024-01-29
Payer: COMMERCIAL

## 2024-01-29 PROCEDURE — G0151 HHCP-SERV OF PT,EA 15 MIN: HCPCS

## 2024-01-29 ASSESSMENT — ENCOUNTER SYMPTOMS
PAIN: 1
PAIN LOCATION: RIGHT HIP
PERSON REPORTING PAIN: PATIENT

## 2024-01-31 ENCOUNTER — HOME CARE VISIT (OUTPATIENT)
Dept: HOME HEALTH SERVICES | Facility: HOME HEALTH | Age: 59
End: 2024-01-31
Payer: COMMERCIAL

## 2024-01-31 PROCEDURE — G0151 HHCP-SERV OF PT,EA 15 MIN: HCPCS

## 2024-01-31 ASSESSMENT — ENCOUNTER SYMPTOMS
PAIN: 1
PERSON REPORTING PAIN: PATIENT
PAIN LOCATION: RIGHT HIP

## 2024-02-04 LAB
ATRIAL RATE: 72 BPM
P AXIS: 28 DEGREES
P OFFSET: 189 MS
P ONSET: 146 MS
PR INTERVAL: 130 MS
Q ONSET: 211 MS
QRS COUNT: 12 BEATS
QRS DURATION: 88 MS
QT INTERVAL: 414 MS
QTC CALCULATION(BAZETT): 453 MS
QTC FREDERICIA: 440 MS
R AXIS: 31 DEGREES
T AXIS: 13 DEGREES
T OFFSET: 418 MS
VENTRICULAR RATE: 72 BPM

## 2024-02-05 ENCOUNTER — HOME CARE VISIT (OUTPATIENT)
Dept: HOME HEALTH SERVICES | Facility: HOME HEALTH | Age: 59
End: 2024-02-05
Payer: COMMERCIAL

## 2024-02-05 PROCEDURE — G0151 HHCP-SERV OF PT,EA 15 MIN: HCPCS

## 2024-02-06 PROCEDURE — G0180 MD CERTIFICATION HHA PATIENT: HCPCS | Performed by: ORTHOPAEDIC SURGERY

## 2024-02-07 ENCOUNTER — HOME CARE VISIT (OUTPATIENT)
Dept: HOME HEALTH SERVICES | Facility: HOME HEALTH | Age: 59
End: 2024-02-07
Payer: COMMERCIAL

## 2024-02-07 VITALS — TEMPERATURE: 98.7 F | SYSTOLIC BLOOD PRESSURE: 118 MMHG | DIASTOLIC BLOOD PRESSURE: 76 MMHG | HEART RATE: 74 BPM

## 2024-02-07 PROCEDURE — G0151 HHCP-SERV OF PT,EA 15 MIN: HCPCS

## 2024-02-07 ASSESSMENT — ACTIVITIES OF DAILY LIVING (ADL)
OASIS_M1830: 00
HOME_HEALTH_OASIS: 00

## 2024-02-07 ASSESSMENT — ENCOUNTER SYMPTOMS
OCCASIONAL FEELINGS OF UNSTEADINESS: 0
MUSCLE WEAKNESS: 1
LIMITED RANGE OF MOTION: 1

## 2024-02-09 ENCOUNTER — APPOINTMENT (OUTPATIENT)
Dept: PRIMARY CARE | Facility: CLINIC | Age: 59
End: 2024-02-09
Payer: COMMERCIAL

## 2024-02-28 DIAGNOSIS — M10.9 GOUT, UNSPECIFIED CAUSE, UNSPECIFIED CHRONICITY, UNSPECIFIED SITE: Primary | ICD-10-CM

## 2024-02-28 RX ORDER — ALLOPURINOL 100 MG/1
100 TABLET ORAL DAILY
Qty: 90 TABLET | Refills: 3 | Status: SHIPPED | OUTPATIENT
Start: 2024-02-28

## 2024-02-29 ENCOUNTER — OFFICE VISIT (OUTPATIENT)
Dept: ORTHOPEDIC SURGERY | Facility: HOSPITAL | Age: 59
End: 2024-02-29
Payer: COMMERCIAL

## 2024-02-29 ENCOUNTER — HOSPITAL ENCOUNTER (OUTPATIENT)
Dept: RADIOLOGY | Facility: HOSPITAL | Age: 59
Discharge: HOME | End: 2024-02-29
Payer: COMMERCIAL

## 2024-02-29 VITALS — BODY MASS INDEX: 27.17 KG/M2 | WEIGHT: 205 LBS | HEIGHT: 73 IN

## 2024-02-29 DIAGNOSIS — M25.551 RIGHT HIP PAIN: ICD-10-CM

## 2024-02-29 DIAGNOSIS — Z96.641 PRESENCE OF RIGHT ARTIFICIAL HIP JOINT: Primary | ICD-10-CM

## 2024-02-29 DIAGNOSIS — Z96.641 PRESENCE OF RIGHT ARTIFICIAL HIP JOINT: ICD-10-CM

## 2024-02-29 PROCEDURE — 1036F TOBACCO NON-USER: CPT | Performed by: ORTHOPAEDIC SURGERY

## 2024-02-29 PROCEDURE — 99024 POSTOP FOLLOW-UP VISIT: CPT | Performed by: ORTHOPAEDIC SURGERY

## 2024-02-29 PROCEDURE — 73502 X-RAY EXAM HIP UNI 2-3 VIEWS: CPT | Mod: RIGHT SIDE | Performed by: RADIOLOGY

## 2024-02-29 PROCEDURE — 73502 X-RAY EXAM HIP UNI 2-3 VIEWS: CPT | Mod: RT

## 2024-02-29 ASSESSMENT — PAIN SCALES - GENERAL: PAINLEVEL_OUTOF10: 1

## 2024-02-29 ASSESSMENT — PAIN DESCRIPTION - DESCRIPTORS: DESCRIPTORS: SORE;TENDER

## 2024-02-29 ASSESSMENT — PAIN - FUNCTIONAL ASSESSMENT: PAIN_FUNCTIONAL_ASSESSMENT: 0-10

## 2024-04-13 ENCOUNTER — TELEPHONE (OUTPATIENT)
Dept: ORTHOPEDIC SURGERY | Facility: CLINIC | Age: 59
End: 2024-04-13
Payer: COMMERCIAL

## 2024-04-13 DIAGNOSIS — G89.29 CHRONIC PAIN OF RIGHT KNEE: Primary | ICD-10-CM

## 2024-04-13 DIAGNOSIS — M25.561 CHRONIC PAIN OF RIGHT KNEE: Primary | ICD-10-CM

## 2024-04-15 DIAGNOSIS — E78.2 MIXED HYPERLIPIDEMIA: ICD-10-CM

## 2024-04-15 RX ORDER — TRIAMTERENE AND HYDROCHLOROTHIAZIDE 37.5; 25 MG/1; MG/1
1 CAPSULE ORAL EVERY MORNING
Qty: 90 CAPSULE | Refills: 0 | Status: SHIPPED | OUTPATIENT
Start: 2024-04-15 | End: 2024-07-14

## 2024-04-15 NOTE — TELEPHONE ENCOUNTER
Patient requesting triamtrene-hydrochlorothiazide refill  to pharmacy on file.  Patient had hip surgery in January and at PT he is recovering well but had slight setback will reschedule appointment with STEVE.    Advise

## 2024-04-17 ENCOUNTER — HOSPITAL ENCOUNTER (OUTPATIENT)
Dept: RADIOLOGY | Facility: CLINIC | Age: 59
Discharge: HOME | End: 2024-04-17
Payer: COMMERCIAL

## 2024-04-17 DIAGNOSIS — G89.29 CHRONIC PAIN OF RIGHT KNEE: ICD-10-CM

## 2024-04-17 DIAGNOSIS — M25.561 CHRONIC PAIN OF RIGHT KNEE: ICD-10-CM

## 2024-04-17 PROCEDURE — 73562 X-RAY EXAM OF KNEE 3: CPT | Mod: RT

## 2024-04-17 PROCEDURE — 73562 X-RAY EXAM OF KNEE 3: CPT | Mod: RIGHT SIDE | Performed by: RADIOLOGY

## 2024-05-16 DIAGNOSIS — G89.29 CHRONIC PAIN OF RIGHT KNEE: Primary | ICD-10-CM

## 2024-05-16 DIAGNOSIS — M25.561 CHRONIC PAIN OF RIGHT KNEE: Primary | ICD-10-CM

## 2024-05-16 NOTE — PROGRESS NOTES
He has been having continued pain and swelling and locking and mechanical symptoms along the medial side of the right knee despite conservative treatment including physical therapy.  NSAID usage has not been helpful.  He will be sent for an MRI to assess for a medial meniscus tear.

## 2024-05-30 ENCOUNTER — OFFICE VISIT (OUTPATIENT)
Dept: ORTHOPEDIC SURGERY | Facility: HOSPITAL | Age: 59
End: 2024-05-30
Payer: COMMERCIAL

## 2024-05-30 ENCOUNTER — HOSPITAL ENCOUNTER (OUTPATIENT)
Dept: RADIOLOGY | Facility: CLINIC | Age: 59
Discharge: HOME | End: 2024-05-30
Payer: COMMERCIAL

## 2024-05-30 ENCOUNTER — HOSPITAL ENCOUNTER (OUTPATIENT)
Dept: RADIOLOGY | Facility: HOSPITAL | Age: 59
Discharge: HOME | End: 2024-05-30
Payer: COMMERCIAL

## 2024-05-30 VITALS — BODY MASS INDEX: 27.83 KG/M2 | WEIGHT: 210 LBS | HEIGHT: 73 IN

## 2024-05-30 DIAGNOSIS — Z96.641 PRESENCE OF RIGHT ARTIFICIAL HIP JOINT: Primary | ICD-10-CM

## 2024-05-30 DIAGNOSIS — G89.29 CHRONIC PAIN OF RIGHT KNEE: ICD-10-CM

## 2024-05-30 DIAGNOSIS — M25.561 CHRONIC PAIN OF RIGHT KNEE: ICD-10-CM

## 2024-05-30 DIAGNOSIS — Z96.641 PRESENCE OF RIGHT ARTIFICIAL HIP JOINT: ICD-10-CM

## 2024-05-30 PROCEDURE — 99213 OFFICE O/P EST LOW 20 MIN: CPT | Performed by: ORTHOPAEDIC SURGERY

## 2024-05-30 PROCEDURE — 73721 MRI JNT OF LWR EXTRE W/O DYE: CPT | Mod: RT

## 2024-05-30 PROCEDURE — 73721 MRI JNT OF LWR EXTRE W/O DYE: CPT | Mod: RIGHT SIDE | Performed by: RADIOLOGY

## 2024-05-30 PROCEDURE — 73502 X-RAY EXAM HIP UNI 2-3 VIEWS: CPT | Mod: RIGHT SIDE | Performed by: RADIOLOGY

## 2024-05-30 PROCEDURE — 73502 X-RAY EXAM HIP UNI 2-3 VIEWS: CPT | Mod: RT

## 2024-05-30 PROCEDURE — 1036F TOBACCO NON-USER: CPT | Performed by: ORTHOPAEDIC SURGERY

## 2024-05-30 ASSESSMENT — PAIN - FUNCTIONAL ASSESSMENT: PAIN_FUNCTIONAL_ASSESSMENT: 0-10

## 2024-05-30 ASSESSMENT — PAIN SCALES - GENERAL: PAINLEVEL_OUTOF10: 0 - NO PAIN

## 2024-05-30 NOTE — PROGRESS NOTES
58-year-old is seen following right total hip arthroplasty on January 22, 2024.  He is doing very well.  He has no pain.  He has improved significantly from the preoperative symptoms and is very satisfied with his progress.  Has been having medial sided right knee pain and is scheduled for an MRI today.    Pleasant and no acute distress.  Walks with a minimally antalgic gait.  Right hip flexes 95 degrees good internal/external rotation and no pain.  He stands with a level pelvis.  Right knee range of motion 320 degrees.  Angela's is positive with pain medially.  There is a mild effusion and medial joint line tenderness.  The knee is stable to varus valgus stress Lachman and posterior drawer.    AP pelvis and AP/lateral x-rays of the right hip are personally reviewed and the right total hip arthroplasty is in good position and well-fixed.    Precautions reviewed.  He will have the MRI to evaluate his knee.  There is potential need for knee arthroscopy depending on the MRI result.  He will avoid aggravating activities.  In regard to the hip he should follow-up at 1 year from the date of surgery.  Follow-up based on his symptoms in regard to the knee.

## 2025-01-02 DIAGNOSIS — Z00.00 LABORATORY TESTS ORDERED AS PART OF A COMPLETE PHYSICAL EXAM (CPE): ICD-10-CM

## 2025-01-15 DIAGNOSIS — E78.2 MIXED HYPERLIPIDEMIA: ICD-10-CM

## 2025-01-15 RX ORDER — ROSUVASTATIN CALCIUM 20 MG/1
20 TABLET, COATED ORAL DAILY
Qty: 90 TABLET | Refills: 3 | Status: SHIPPED | OUTPATIENT
Start: 2025-01-15

## 2025-01-29 LAB
25(OH)D3+25(OH)D2 SERPL-MCNC: 35 NG/ML (ref 30–100)
ALT SERPL-CCNC: 20 U/L (ref 9–46)
ANION GAP SERPL CALCULATED.4IONS-SCNC: 14 MMOL/L (CALC) (ref 7–17)
APPEARANCE UR: CLEAR
BILIRUB UR QL STRIP: NEGATIVE
BUN SERPL-MCNC: 20 MG/DL (ref 7–25)
BUN/CREAT SERPL: ABNORMAL (CALC) (ref 6–22)
CALCIUM SERPL-MCNC: 9.2 MG/DL (ref 8.6–10.3)
CHLORIDE SERPL-SCNC: 100 MMOL/L (ref 98–110)
CHOLEST SERPL-MCNC: 153 MG/DL
CHOLEST/HDLC SERPL: 3.2 (CALC)
CO2 SERPL-SCNC: 25 MMOL/L (ref 20–32)
COLOR UR: YELLOW
CREAT SERPL-MCNC: 1.21 MG/DL (ref 0.7–1.35)
EGFRCR SERPLBLD CKD-EPI 2021: 68 ML/MIN/1.73M2
ERYTHROCYTE [DISTWIDTH] IN BLOOD BY AUTOMATED COUNT: 12.8 % (ref 11–15)
EST. AVERAGE GLUCOSE BLD GHB EST-MCNC: 117 MG/DL
EST. AVERAGE GLUCOSE BLD GHB EST-SCNC: 6.5 MMOL/L
GLUCOSE SERPL-MCNC: 105 MG/DL (ref 65–99)
GLUCOSE UR QL STRIP: NEGATIVE
HBA1C MFR BLD: 5.7 % OF TOTAL HGB
HCT VFR BLD AUTO: 48.4 % (ref 38.5–50)
HDLC SERPL-MCNC: 48 MG/DL
HGB BLD-MCNC: 15.9 G/DL (ref 13.2–17.1)
HGB UR QL STRIP: NEGATIVE
KETONES UR QL STRIP: NEGATIVE
LDLC SERPL CALC-MCNC: 85 MG/DL (CALC)
LEUKOCYTE ESTERASE UR QL STRIP: NEGATIVE
MCH RBC QN AUTO: 30.5 PG (ref 27–33)
MCHC RBC AUTO-ENTMCNC: 32.9 G/DL (ref 32–36)
MCV RBC AUTO: 92.7 FL (ref 80–100)
NITRITE UR QL STRIP: NEGATIVE
NONHDLC SERPL-MCNC: 105 MG/DL (CALC)
PH UR STRIP: 6 [PH] (ref 5–8)
PLATELET # BLD AUTO: 268 THOUSAND/UL (ref 140–400)
PMV BLD REES-ECKER: 9.6 FL (ref 7.5–12.5)
POTASSIUM SERPL-SCNC: 4 MMOL/L (ref 3.5–5.3)
PROT UR QL STRIP: NEGATIVE
PSA SERPL-MCNC: 0.45 NG/ML
RBC # BLD AUTO: 5.22 MILLION/UL (ref 4.2–5.8)
SODIUM SERPL-SCNC: 139 MMOL/L (ref 135–146)
SP GR UR STRIP: 1.02 (ref 1–1.03)
TRIGL SERPL-MCNC: 103 MG/DL
TSH SERPL-ACNC: 6.7 MIU/L (ref 0.4–4.5)
VIT B12 SERPL-MCNC: 476 PG/ML (ref 200–1100)
WBC # BLD AUTO: 6 THOUSAND/UL (ref 3.8–10.8)

## 2025-02-05 ENCOUNTER — APPOINTMENT (OUTPATIENT)
Dept: PRIMARY CARE | Facility: CLINIC | Age: 60
End: 2025-02-05
Payer: COMMERCIAL

## 2025-02-05 VITALS
BODY MASS INDEX: 28.49 KG/M2 | WEIGHT: 215 LBS | DIASTOLIC BLOOD PRESSURE: 90 MMHG | OXYGEN SATURATION: 100 % | HEART RATE: 98 BPM | HEIGHT: 73 IN | SYSTOLIC BLOOD PRESSURE: 140 MMHG | TEMPERATURE: 97.7 F

## 2025-02-05 DIAGNOSIS — E78.2 MIXED HYPERLIPIDEMIA: ICD-10-CM

## 2025-02-05 DIAGNOSIS — Z00.00 ROUTINE ADULT HEALTH MAINTENANCE: Primary | ICD-10-CM

## 2025-02-05 PROCEDURE — 3077F SYST BP >= 140 MM HG: CPT | Performed by: INTERNAL MEDICINE

## 2025-02-05 PROCEDURE — 99396 PREV VISIT EST AGE 40-64: CPT | Performed by: INTERNAL MEDICINE

## 2025-02-05 PROCEDURE — 3008F BODY MASS INDEX DOCD: CPT | Performed by: INTERNAL MEDICINE

## 2025-02-05 PROCEDURE — 3080F DIAST BP >= 90 MM HG: CPT | Performed by: INTERNAL MEDICINE

## 2025-02-05 RX ORDER — ROSUVASTATIN CALCIUM 20 MG/1
20 TABLET, COATED ORAL DAILY
Qty: 90 TABLET | Refills: 3 | Status: SHIPPED | OUTPATIENT
Start: 2025-02-05

## 2025-02-05 RX ORDER — LEVOTHYROXINE SODIUM 50 UG/1
50 TABLET ORAL DAILY
Qty: 30 TABLET | Refills: 11 | Status: SHIPPED | OUTPATIENT
Start: 2025-02-05 | End: 2026-02-05

## 2025-02-05 ASSESSMENT — PATIENT HEALTH QUESTIONNAIRE - PHQ9
4. FEELING TIRED OR HAVING LITTLE ENERGY: NOT AT ALL
1. LITTLE INTEREST OR PLEASURE IN DOING THINGS: NOT AT ALL
2. FEELING DOWN, DEPRESSED OR HOPELESS: NOT AT ALL
3. TROUBLE FALLING OR STAYING ASLEEP OR SLEEPING TOO MUCH: NOT AT ALL
5. POOR APPETITE OR OVEREATING: NOT AT ALL
9. THOUGHTS THAT YOU WOULD BE BETTER OFF DEAD, OR OF HURTING YOURSELF: NOT AT ALL
SUM OF ALL RESPONSES TO PHQ9 QUESTIONS 1 AND 2: 0
6. FEELING BAD ABOUT YOURSELF - OR THAT YOU ARE A FAILURE OR HAVE LET YOURSELF OR YOUR FAMILY DOWN: NOT AT ALL
10. IF YOU CHECKED OFF ANY PROBLEMS, HOW DIFFICULT HAVE THESE PROBLEMS MADE IT FOR YOU TO DO YOUR WORK, TAKE CARE OF THINGS AT HOME, OR GET ALONG WITH OTHER PEOPLE: NOT DIFFICULT AT ALL
7. TROUBLE CONCENTRATING ON THINGS, SUCH AS READING THE NEWSPAPER OR WATCHING TELEVISION: NOT AT ALL

## 2025-02-05 ASSESSMENT — ANXIETY QUESTIONNAIRES
1. FEELING NERVOUS, ANXIOUS, OR ON EDGE: NOT AT ALL
3. WORRYING TOO MUCH ABOUT DIFFERENT THINGS: NOT AT ALL
4. TROUBLE RELAXING: NOT AT ALL
6. BECOMING EASILY ANNOYED OR IRRITABLE: NOT AT ALL
5. BEING SO RESTLESS THAT IT IS HARD TO SIT STILL: NOT AT ALL
GAD7 TOTAL SCORE: 0
2. NOT BEING ABLE TO STOP OR CONTROL WORRYING: NOT AT ALL
7. FEELING AFRAID AS IF SOMETHING AWFUL MIGHT HAPPEN: NOT AT ALL
IF YOU CHECKED OFF ANY PROBLEMS ON THIS QUESTIONNAIRE, HOW DIFFICULT HAVE THESE PROBLEMS MADE IT FOR YOU TO DO YOUR WORK, TAKE CARE OF THINGS AT HOME, OR GET ALONG WITH OTHER PEOPLE: NOT DIFFICULT AT ALL

## 2025-02-05 NOTE — PROGRESS NOTES
"Subjective   Patient ID: Tye Hendrickson \"Gurwinder" is a 59 y.o. male who presents for Annual Exam and Med Refill.    Patient comes in for a physical examination, doing well over - all with no particular complaints.   Also in for laboratory review and health maintenance update.  Updating family history as well.      Med Refill        Review of Systems   All other systems reviewed and are negative.  General: Denies fever, chills, night sweats, changes in appetite  ENT:  Negative for ear pain, hearing loss, headache, difficulty swallowing  Eyes: Negative for recent visual changes, up to date with eye exams  Dermatologic: Negative for new skin conditions, rash  Respiratory: Negative for wheezing, shortness of breath, cough  Cardiovascular: Negative for chest pain, palpitations, or leg swelling  Gastrointestinal: Negative for nausea/vomiting, abdominal pain, changes in bowel habits  Genitourinary: Negative for dysuria, urgency, incontinence, frequency  Neurological:  Negative for headaches, tremors, dizziness, memory changes, confusion, weakness, paresthesias  Psychiatric:  Negative for anxiety, depression or conditions are stable with medications  Endocrine:  Negative for heat and cold intolerance, polyuria, polydipsia  Other:  All systems have been reviewed and are negative except as previously noted.      Previous history  Past Medical History:   Diagnosis Date    Gout     HTN (hypertension)     Hyperlipidemia     Nephrolithiasis     White coat syndrome with hypertension      Past Surgical History:   Procedure Laterality Date    COLONOSCOPY      HERNIA REPAIR      Laparoscopy Repair Of Initial Inguinal Hernia    TOTAL HIP ARTHROPLASTY Right 01/22/2024     Social History     Tobacco Use    Smoking status: Never    Smokeless tobacco: Never   Vaping Use    Vaping status: Never Used   Substance Use Topics    Alcohol use: Not Currently     Comment: no alcohol x 15yrs    Drug use: Never     Family History   Problem Relation " "Name Age of Onset    Hypertension Mother      Colon cancer Father       No Known Allergies  Current Outpatient Medications   Medication Instructions    acetaminophen (TYLENOL) 650 mg, oral, Every 6 hours scheduled    allopurinol (ZYLOPRIM) 100 mg, oral, Daily    docusate sodium (COLACE) 100 mg, oral, Daily    polyethylene glycol (GLYCOLAX, MIRALAX) 17 g, oral, Daily PRN    rosuvastatin (CRESTOR) 20 mg, oral, Daily    Synthroid 50 mcg, oral, Daily, Take on an empty stomach at the same time each day, either 30 to 60 minutes prior to breakfast    triamterene-hydrochlorothiazid (Dyazide) 37.5-25 mg capsule 1 capsule, oral, Every morning       Objective       Physical Exam  Vital Signs: as recorded above  General: Well groomed, well nourished   Orientation:  Alert , oriented to time, place , and person   Mood and Affect:  Cooperative , no apparent distress normal affect  Skin: Good color, good turgor  Eyes: Extra ocular muscle movements intact, anicteric sclerae  Ears:  Auditory canals clear , TMS intact   Neck: Supple, full range of movement  Chest: Normal breath sounds, normal chest wall exam, symmetric, good air entry, clear to auscultation  Heart: Regular rate and rhythm, without murmur, gallop, or rubs  Abdomen soft nontender no masses felt no hepatosplenomegaly, no rebound or guarding  BACK:  no CTLS spine tenderness, no flank tenderness  Extremities: full range of movement  bilateral UE and bilateral LE,  no lower extremity edema  Neurological: Alert, oriented, cranial nerves II-XII intact except for visual acuity  Sensation:  Intact   Gait: normal steady      Assessment/Plan   Tye Hendrickson \"Ady\" is a 59 y.o. male who presents for the concerns below:    Problem List Items Addressed This Visit       Mixed hyperlipidemia    Relevant Medications    rosuvastatin (Crestor) 20 mg tablet    Synthroid 50 mcg tablet     HYPERCHOLESTEROLEMIA PLAN: stable  continue current medications  Follow low cholesterol diet, " encouraged high omega 3 fatty acid intake in diet, exercise, weight control. . Will Obtain AST/ALT, fasting lipid profile, creatine kinase  on statin if not done within past 3-6 months . Coenzyme Q10 200 mg a day if able to help increase HDL.  HYPOTHYROIDISM PLAN:  Patient is clinically stable does not have any changes in bowel habits, skin, blood pressure, heart rates, weight, and mood, will be due for TSH check.  Refills as needed.   Reiterated that patient takes medication on an empty stomach.  ASSESSMENT AND PLAN: Patient on examination is in good health, will do screening blood tests to screen for high cholesterol, diabetes, thyroid, Ekg if above 50 years old or earlier if with cardiac history. Patient should be taking enough calcium in a balanced diet For Male Patients Only:  Prostate cancer screening psa.Preventive Medicine: colon cancer screening by age 50 if no family history, balanced diet, and exercise as discussed. Seat belt use for injury prevention, living will. Substance use and /or tobacco use counseled when applicable. Alcohol use discussed, use designated . Immunizations TD age 50 and every 10 years. Pneumovax and shingles vaccine counseled. Yearly flu vaccine unless contraindicated. More than 50% of office visit time spent counseling the patient, questions were answered. If problems arise, patient is to call or come back in. It is understood that the responsibility of healthcare is shared by the patient by following a healthy lifestyle and following the plan above as discussed. Complete physical examination in a year Pending results, may need to come for a return office visit for follow-up.         Discussed with:   Return in :    Portions of this note were generated using digital voice recognition software, and may contain grammatical errors       Shan Pagan MD  02/05/25  8:35 AM

## 2025-02-24 DIAGNOSIS — M10.9 GOUT, UNSPECIFIED CAUSE, UNSPECIFIED CHRONICITY, UNSPECIFIED SITE: ICD-10-CM

## 2025-02-24 RX ORDER — ALLOPURINOL 100 MG/1
100 TABLET ORAL DAILY
Qty: 90 TABLET | Refills: 3 | Status: SHIPPED | OUTPATIENT
Start: 2025-02-24

## 2025-03-06 DIAGNOSIS — E78.2 MIXED HYPERLIPIDEMIA: ICD-10-CM

## 2025-03-07 RX ORDER — TRIAMTERENE AND HYDROCHLOROTHIAZIDE 37.5; 25 MG/1; MG/1
1 CAPSULE ORAL DAILY
Qty: 90 CAPSULE | Refills: 3 | Status: SHIPPED | OUTPATIENT
Start: 2025-03-07

## 2025-03-20 PROBLEM — M19.90 OSTEOARTHRITIS: Status: ACTIVE | Noted: 2025-03-20

## 2025-03-20 PROBLEM — R73.01 ELEVATED FASTING GLUCOSE: Status: ACTIVE | Noted: 2025-03-20

## 2025-03-20 PROBLEM — R79.89 HIGH THYROID STIMULATING HORMONE (TSH) LEVEL: Status: ACTIVE | Noted: 2025-03-20

## 2025-03-20 PROBLEM — M10.9 ACUTE GOUT: Status: ACTIVE | Noted: 2025-03-20

## 2025-03-20 PROBLEM — E55.9 VITAMIN D DEFICIENCY: Status: ACTIVE | Noted: 2025-03-20

## 2025-03-20 PROBLEM — R03.0 BLOOD PRESSURE ELEVATED WITHOUT HISTORY OF HTN: Status: ACTIVE | Noted: 2025-03-20

## 2025-03-20 PROBLEM — R05.9 COUGH: Status: ACTIVE | Noted: 2025-03-20

## 2025-03-20 PROBLEM — N20.0 RECURRENT NEPHROLITHIASIS: Status: ACTIVE | Noted: 2025-03-20

## 2025-03-20 PROBLEM — R79.89 ELEVATED TSH: Status: ACTIVE | Noted: 2025-03-20

## 2025-03-20 PROBLEM — Z12.5 SCREENING FOR MALIGNANT NEOPLASM OF PROSTATE: Status: ACTIVE | Noted: 2025-03-20

## 2025-03-20 PROBLEM — E78.00 HIGH CHOLESTEROL: Status: ACTIVE | Noted: 2025-03-20

## 2025-03-20 PROBLEM — N39.0 URINARY TRACT INFECTION, SITE NOT SPECIFIED: Status: ACTIVE | Noted: 2023-08-09

## 2025-03-20 PROBLEM — M19.90 OSTEOARTHRITIS (ARTHRITIS DUE TO WEAR AND TEAR OF JOINTS): Status: ACTIVE | Noted: 2025-03-20

## 2025-03-20 PROBLEM — N20.0 CALCULUS OF KIDNEY: Status: ACTIVE | Noted: 2018-05-25

## 2025-03-20 PROBLEM — R10.31 RIGHT LOWER QUADRANT PAIN: Status: ACTIVE | Noted: 2018-05-25

## 2025-03-20 PROBLEM — E78.5 HYPERLIPIDEMIA: Status: ACTIVE | Noted: 2024-01-21

## 2025-04-24 ENCOUNTER — APPOINTMENT (OUTPATIENT)
Dept: RADIOLOGY | Facility: HOSPITAL | Age: 60
End: 2025-04-24
Payer: COMMERCIAL

## 2025-04-24 ENCOUNTER — HOSPITAL ENCOUNTER (OUTPATIENT)
Dept: RADIOLOGY | Facility: EXTERNAL LOCATION | Age: 60
Discharge: HOME | End: 2025-04-24

## 2025-04-24 ENCOUNTER — OFFICE VISIT (OUTPATIENT)
Dept: ORTHOPEDIC SURGERY | Facility: HOSPITAL | Age: 60
End: 2025-04-24
Payer: COMMERCIAL

## 2025-04-24 DIAGNOSIS — M67.952 TENDINOPATHY OF LEFT GLUTEUS MEDIUS: Primary | ICD-10-CM

## 2025-04-24 DIAGNOSIS — M25.552 LEFT HIP PAIN: ICD-10-CM

## 2025-04-24 PROCEDURE — 99204 OFFICE O/P NEW MOD 45 MIN: CPT | Performed by: EMERGENCY MEDICINE

## 2025-04-24 PROCEDURE — 2500000004 HC RX 250 GENERAL PHARMACY W/ HCPCS (ALT 636 FOR OP/ED): Performed by: EMERGENCY MEDICINE

## 2025-04-24 PROCEDURE — 20610 DRAIN/INJ JOINT/BURSA W/O US: CPT | Mod: LT | Performed by: EMERGENCY MEDICINE

## 2025-04-24 PROCEDURE — 99214 OFFICE O/P EST MOD 30 MIN: CPT | Mod: 25 | Performed by: EMERGENCY MEDICINE

## 2025-04-24 RX ORDER — MELOXICAM 15 MG/1
15 TABLET ORAL DAILY
Qty: 30 TABLET | Refills: 1 | Status: SHIPPED | OUTPATIENT
Start: 2025-04-24 | End: 2025-06-23

## 2025-04-24 RX ORDER — LIDOCAINE HYDROCHLORIDE 10 MG/ML
3 INJECTION, SOLUTION INFILTRATION; PERINEURAL
Status: COMPLETED | OUTPATIENT
Start: 2025-04-24 | End: 2025-04-24

## 2025-04-24 RX ORDER — TRIAMCINOLONE ACETONIDE 40 MG/ML
80 INJECTION, SUSPENSION INTRA-ARTICULAR; INTRAMUSCULAR
Status: COMPLETED | OUTPATIENT
Start: 2025-04-24 | End: 2025-04-24

## 2025-04-24 RX ADMIN — LIDOCAINE HYDROCHLORIDE 3 ML: 10 INJECTION, SOLUTION INFILTRATION; PERINEURAL at 13:37

## 2025-04-24 RX ADMIN — TRIAMCINOLONE ACETONIDE 80 MG: 400 INJECTION, SUSPENSION INTRA-ARTICULAR; INTRAMUSCULAR at 13:37

## 2025-04-24 NOTE — PROGRESS NOTES
"Subjective   Patient ID: Tye Hendrickson \"Gurwinder" is a 59 y.o. male who presents for Pain of the Left Hip.  History of Present Illness  Tye Hendrickson \"Gurwinder" is a 59 year old male who presents with left lateral hip pain.    He has been experiencing left lateral hip pain for almost four weeks, localized over the trochanteric area. The pain is more severe than his previous right hip issue and does not significantly radiate to the groin, with only about 5% of the pain felt there. Bending over and attempting a straight leg raise exacerbate the pain.    X-rays showed mild arthritis but no significant findings of concern. He has been managing the pain with stretching and general physical therapy exercises when possible. Aleve did not provide relief, but Advil has been somewhat helpful, although he prefers to avoid taking many pills.    The onset of the pain followed three consecutive five-mile hikes, some of which were uphill, with the pain beginning two days after these activities. He denies any specific traumatic event leading to the pain.    He has been performing exercises such as bridges, offset bridges, light body weight squats, and offset squats, but has avoided lateral movements due to fear of pinching tendons. He acknowledges weakness in the area, particularly when performing lateral movements, and has not been doing clamshells or other lateral exercises.    No significant groin pain, back pain, or sciatic symptoms. Pain is primarily on the lateral side of the hip.    All other systems have been reviewed and are negative for complaint.      Objective     There were no vitals taken for this visit.     Physical Exam  MUSCULOSKELETAL: Mild tenderness over left trochanteric area. Negative log roll and DAKOTA tests for left hip. Negative straight leg raise for left hip. Negative Stinchfield for left hip.  Muscle strength 4 out of 5 with hip abduction.  Otherwise, full muscle strength throughout.  Negative Viry.  " "Negative FADIR.    Imaging:   I have personally reviewed and agree with Radiologist interpretation of previous imaging studies performed prior to this visit.  STUDY:  XR HIP RIGHT WITH PELVIS WHEN PERFORMED 2 OR 3 VIEWS; ;  5/30/2024  12:54 pm      INDICATION:  Signs/Symptoms:PAIN.      COMPARISON:  02/29/2020      ACCESSION NUMBER(S):  [id number]      ORDERING CLINICIAN:  CHERELLE MARROQUIN      FINDINGS:  Right hip, three views      Right total hip arthroplasty in place. There is no periprosthetic  fracture or lucency. There is no dislocation. Mild degenerative  change the left hip noted      IMPRESSION:      No hardware failure about the right total hip arthroplasty      MACRO:  None      Signed by: Marin Billy 5/31/2024 7:45 PM  Dictation workstation:   [id number]      Patient ID: Tye Hendrickson \"Ady\" is a 59 y.o. male.    L Inj/Asp: L greater trochanteric bursa (Left gluteus medius tendon) on 4/24/2025 1:37 PM  Indications: pain  Details: 25 G needle, posterior approach  Medications: 80 mg triamcinolone acetonide 40 mg/mL; 3 mL lidocaine 10 mg/mL (1 %)  Outcome: tolerated well, no immediate complications  Procedure, treatment alternatives, risks and benefits explained, specific risks discussed. Consent was given by the patient. Immediately prior to procedure a time out was called to verify the correct patient, procedure, equipment, support staff and site/side marked as required. Patient was prepped and draped in the usual sterile fashion.               1. Tendinopathy of left gluteus medius  Point of Care Ultrasound    Referral to Physical Therapy    meloxicam (Mobic) 15 mg tablet      2. Left hip pain  CANCELED: XR hip left with pelvis when performed 2 or 3 views          Assessment & Plan  Left hip pain  Lateral left hip pain over the trochanteric area, more severe than previous right hip issues. X-ray negative for significant arthritis. Symptoms consistent with gluteal tendinosis.  - Perform " ultrasound-guided injection to avoid tendon steroid injection.  - Prescribe meloxicam 15 mg daily as needed.  - Advise no water submersion for two days post-injection.  - Advise no high-impact activities for two days post-injection.    Gluteal tendinosis  Weakness and irritation in the gluteal region, likely due to recent physical activity. Symptoms consistent with gluteal tendinosis, not sciatic involvement. MRI not indicated at this time.  - Initiate physical therapy focusing on lateral movements, lunges, and side planks.  - Provide formal therapy referral for gluteal strengthening.  - Advise starting lateral exercises two days post-injection.  - Encourage continuation of bridges and squats, emphasizing lateral movements.    Cirilo Henderson, DO         This medical note was created with the assistance of artificial intelligence (AI) for documentation purposes. The content has been reviewed and confirmed by the healthcare provider for accuracy and completeness. Patient consented to the use of audio recording and use of AI during their visit.

## 2025-05-02 DIAGNOSIS — M67.952 TENDINOPATHY OF LEFT GLUTEUS MEDIUS: Primary | ICD-10-CM

## 2025-05-03 NOTE — PROGRESS NOTES
I spoke with patient regarding continued left hip pain.  He does feel that his pain has not improved since last visit.  He has been working on exercises without clinical improvement.  He does additionally note increased weakness.  Please reason, I do feel that further imaging is warranted.  I have ordered MRI of his left hip.  I will follow-up on these results.

## 2025-05-12 ENCOUNTER — APPOINTMENT (OUTPATIENT)
Dept: RADIOLOGY | Facility: CLINIC | Age: 60
End: 2025-05-12
Payer: COMMERCIAL

## 2025-05-12 DIAGNOSIS — M67.952 TENDINOPATHY OF LEFT GLUTEUS MEDIUS: ICD-10-CM

## 2025-05-12 PROCEDURE — 73721 MRI JNT OF LWR EXTRE W/O DYE: CPT | Mod: LT

## 2025-08-05 ENCOUNTER — TELEPHONE (OUTPATIENT)
Dept: PRIMARY CARE | Facility: CLINIC | Age: 60
End: 2025-08-05
Payer: COMMERCIAL

## 2025-08-05 DIAGNOSIS — E78.2 MIXED HYPERLIPIDEMIA: ICD-10-CM

## 2025-08-05 NOTE — TELEPHONE ENCOUNTER
Pt called and said that he no longer uses [facility] he now uses [facility] due to his insurance change    Toileting/Walking/Standing

## 2025-08-06 RX ORDER — ROSUVASTATIN CALCIUM 20 MG/1
20 TABLET, COATED ORAL DAILY
Qty: 90 TABLET | Refills: 3 | Status: SHIPPED | OUTPATIENT
Start: 2025-08-06

## 2025-08-06 RX ORDER — LEVOTHYROXINE SODIUM 50 UG/1
50 TABLET ORAL DAILY
Qty: 90 TABLET | Refills: 3 | Status: SHIPPED | OUTPATIENT
Start: 2025-08-06 | End: 2026-08-06

## 2025-08-06 RX ORDER — TRIAMTERENE AND HYDROCHLOROTHIAZIDE 37.5; 25 MG/1; MG/1
1 CAPSULE ORAL DAILY
Qty: 90 CAPSULE | Refills: 3 | Status: SHIPPED | OUTPATIENT
Start: 2025-08-06

## 2025-08-27 DIAGNOSIS — M25.552 LEFT HIP PAIN: ICD-10-CM

## 2025-08-27 DIAGNOSIS — S72.002S HIP FRACTURE, LEFT, SEQUELA: Primary | ICD-10-CM

## (undated) DEVICE — DRAPE, SHEET, THREE QUARTER, FAN FOLD, 57 X 77 IN

## (undated) DEVICE — SPONGE, LAP, XRAY DECT, 18IN X 18IN, W/MASTER DMT, STERILE

## (undated) DEVICE — Device

## (undated) DEVICE — BAG, BANDED, 36IN X 28IN

## (undated) DEVICE — BLADE, SAGITTAL DUAL CUT, 25 X 90 X 1.27

## (undated) DEVICE — BASIN KIT, SINGLE

## (undated) DEVICE — DRAPE, TIBURON, HIP W/POUCHES

## (undated) DEVICE — APPLICATOR, CHLORAPREP, W/ORANGE TINT, 26ML

## (undated) DEVICE — MARKER, SKIN, REGULAR TIP, W/FLEXI-RULER

## (undated) DEVICE — SLEEVE, VASO PRESS, CALF GARMENT, MEDIUM, GREEN

## (undated) DEVICE — DRAPE, SHEET, U, W/ADHESIVE STRIP, IMPERVIOUS, 60 X 70 IN, DISPOSABLE, LF, STERILE

## (undated) DEVICE — GOWN, SURGICAL, IMPLT, BACK, XLARGE, XLONG, STERILE

## (undated) DEVICE — TUBING, IRRIGATION, HIGH FLOW, HAND PIECE SET

## (undated) DEVICE — DRAPE, U-DRAPE, NON STERILE

## (undated) DEVICE — SPONGE, LAP, XRAY DECT, 4IN X 18IN, W/MASTER DMT, STERILE

## (undated) DEVICE — RETRIEVER, SUTURE, HEWSON

## (undated) DEVICE — BANDAGE, COFLEX, 6 X 5 YDS, FOAM TAN, STERILE, LF

## (undated) DEVICE — DRESSING, AQUACEL AG, HYDROFIBER W/SILVER, 3.5 X 13.75 IN

## (undated) DEVICE — MAT, AIR TRANSFER, 39X81

## (undated) DEVICE — WOUND SYSTEM, DEBRIDEMENT & CLEANING, O.R DUOPAK